# Patient Record
Sex: FEMALE | Race: BLACK OR AFRICAN AMERICAN | NOT HISPANIC OR LATINO | Employment: FULL TIME | RURAL
[De-identification: names, ages, dates, MRNs, and addresses within clinical notes are randomized per-mention and may not be internally consistent; named-entity substitution may affect disease eponyms.]

---

## 2017-01-04 DIAGNOSIS — M54.40 BILATERAL LOW BACK PAIN WITH SCIATICA: ICD-10-CM

## 2017-01-04 RX ORDER — IBUPROFEN 800 MG/1
TABLET ORAL
Qty: 120 TABLET | Refills: 0 | Status: SHIPPED | OUTPATIENT
Start: 2017-01-04 | End: 2017-01-27 | Stop reason: SDUPTHER

## 2017-01-18 ENCOUNTER — OFFICE VISIT (OUTPATIENT)
Dept: RETAIL CLINIC | Facility: CLINIC | Age: 42
End: 2017-01-18

## 2017-01-18 VITALS
SYSTOLIC BLOOD PRESSURE: 138 MMHG | DIASTOLIC BLOOD PRESSURE: 80 MMHG | HEART RATE: 110 BPM | BODY MASS INDEX: 39.08 KG/M2 | HEIGHT: 67 IN | OXYGEN SATURATION: 99 % | WEIGHT: 249 LBS | TEMPERATURE: 98.6 F

## 2017-01-18 DIAGNOSIS — L73.2 SUPPURATIVE HIDRADENITIS: Primary | ICD-10-CM

## 2017-01-18 PROCEDURE — 99212 OFFICE O/P EST SF 10 MIN: CPT | Performed by: NURSE PRACTITIONER

## 2017-01-18 RX ORDER — CEPHALEXIN 500 MG/1
500 CAPSULE ORAL 3 TIMES DAILY
Qty: 30 CAPSULE | Refills: 0 | Status: SHIPPED | OUTPATIENT
Start: 2017-01-18 | End: 2017-01-27

## 2017-01-18 NOTE — PROGRESS NOTES
Subjective   Owen Hooper is a 41 y.o. female.     HPI Comments: Patient reports history of suppurative hidradenitis about 3 years ago and seen by surgeon who recommended surgery.  Areas were drained and improved so she never followed up.  Has really had no subsequent problems until the last 3 weeks.     Abscess   This is a new problem. Episode onset: over the last three weeks--has had two to left axilla that have opened and drained on their own, one to right axilla is not draining and painful. The problem occurs constantly. The problem has been gradually worsening. Pertinent negatives include no abdominal pain, anorexia, arthralgias, change in bowel habit, chest pain, chills, congestion, coughing, diaphoresis, fatigue, fever, headaches, joint swelling, myalgias, nausea, neck pain, numbness, rash, sore throat, swollen glands, urinary symptoms, vertigo, visual change, vomiting or weakness. Exacerbated by: lifting right arm. Treatments tried: simmons, warm compresses, ibuprofen. The treatment provided no relief.        The following portions of the patient's history were reviewed and updated as appropriate: allergies, current medications, past medical history and past social history.    Review of Systems   Constitutional: Negative for activity change, appetite change, chills, diaphoresis, fatigue and fever.   HENT: Negative for congestion and sore throat.    Respiratory: Negative for cough, chest tightness and wheezing.    Cardiovascular: Negative.  Negative for chest pain.   Gastrointestinal: Negative for abdominal pain, anorexia, change in bowel habit, diarrhea, nausea and vomiting.   Musculoskeletal: Negative for arthralgias, joint swelling, myalgias, neck pain and neck stiffness.   Skin: Negative for rash.        Abscess axilla     Neurological: Negative for dizziness, vertigo, weakness, numbness and headaches.   Hematological: Negative for adenopathy.   Psychiatric/Behavioral: Negative.        Objective   "  Visit Vitals   • /80 (BP Location: Left arm, Patient Position: Sitting, Cuff Size: Adult)   • Pulse 110   • Temp 98.6 °F (37 °C) (Tympanic)   • Ht 67\" (170.2 cm)   • Wt 249 lb (113 kg)   • SpO2 99%   • BMI 39 kg/m2       Physical Exam   Constitutional: She is oriented to person, place, and time. She appears well-developed. No distress.   Neurological: She is alert and oriented to person, place, and time.   Skin:   Two healing abscessed areas to left axilla.     Nickel sized indurated, very tender area to right axilla with no opening or drainage noted.     Psychiatric: She has a normal mood and affect. Her behavior is normal.   Nursing note and vitals reviewed.      Assessment/Plan   Owen was seen today for abscess.    Diagnoses and all orders for this visit:    Suppurative hidradenitis  -     cephalexin (KEFLEX) 500 MG capsule; Take 1 capsule by mouth 3 (Three) Times a Day.  -     Benzocaine (BOIL EASE MAXIMUM STRENGTH) 20 % ointment; Apply to affected area to right axilla QID prn pain      Warm compresses over the area  If you must wear deodorant, use spray deodorant    F/U with PCP as scheduled for 1-27-17 for recheck and possible referral back to surgeon if needed.     Declines RTW note.         "

## 2017-01-18 NOTE — PATIENT INSTRUCTIONS
Hidradenitis Suppurativa  Hidradenitis suppurativa is a long-term (chronic) skin disease that starts with blocked sweat glands or hair follicles. Bacteria may grow in these blocked openings of your skin. Hidradenitis suppurativa is like a severe form of acne that develops in areas of your body where acne would be unusual. It is most likely to affect the areas of your body where skin rubs against skin and becomes moist. This includes your:  · Underarms.  · Groin.  · Genital areas.  · Buttocks.  · Upper thighs.  · Breasts.  Hidradenitis suppurativa may start out with small pimples. The pimples can develop into deep sores that break open (rupture) and drain pus. Over time your skin may thicken and become scarred. Hidradenitis suppurativa cannot be passed from person to person.   CAUSES   The exact cause of hidradenitis suppurativa is not known. This condition may be due to:  · Male and female hormones. The condition is rare before and after puberty.  · An overactive body defense system (immune system). Your immune system may overreact to the blocked hair follicles or sweat glands and cause swelling and pus-filled sores.  RISK FACTORS  You may have a higher risk of hidradenitis suppurativa if you:  · Are a woman.  · Are between ages 11 and 55.  · Have a family history of hidradenitis suppurativa.  · Have a personal history of acne.  · Are overweight.  · Smoke.  · Take the drug lithium.  SIGNS AND SYMPTOMS   The first signs of an outbreak are usually painful skin bumps that look like pimples. As the condition progresses:  · Skin bumps may get bigger and grow deeper into the skin.  · Bumps under the skin may rupture and drain smelly pus.  · Skin may become itchy and infected.  · Skin may thicken and scar.  · Drainage may continue through tunnels under the skin (fistulas).  · Walking and moving your arms can become painful.  DIAGNOSIS   Your health care provider may diagnose hidradenitis suppurativa based on your medical  history and your signs and symptoms. A physical exam will also be done. You may need to see a health care provider who specializes in skin diseases (dermatologist). You may also have tests done to confirm the diagnosis. These can include:  · Swabbing a sample of pus or drainage from your skin so it can be sent to the lab and tested for infection.  · Blood tests to check for infection.  TREATMENT   The same treatment will not work for everybody with hidradenitis suppurativa. Your treatment will depend on how severe your symptoms are. You may need to try several treatments to find what works best for you. Part of your treatment may include cleaning and bandaging (dressing) your wounds. You may also have to take medicines, such as the following:  · Antibiotics.  · Acne medicines.  · Medicines to block or suppress the immune system.  · A diabetes medicine (metformin) is sometimes used to treat this condition.  · For women, birth control pills can sometimes help relieve symptoms.  You may need surgery if you have a severe case of hidradenitis suppurativa that does not respond to medicine. Surgery may involve:   · Using a laser to clear the skin and remove hair follicles.  · Opening and draining deep sores.  · Removing the areas of skin that are diseased and scarred.  HOME CARE INSTRUCTIONS  · Learn as much as you can about your disease, and work closely with your health care providers.  · Take medicines only as directed by your health care provider.  · If you were prescribed an antibiotic medicine, finish it all even if you start to feel better.  · If you are overweight, losing weight may be very helpful. Try to reach and maintain a healthy weight.  · Do not use any tobacco products, including cigarettes, chewing tobacco, or electronic cigarettes. If you need help quitting, ask your health care provider.  · Do not shave the areas where you get hidradenitis suppurativa.  · Do not wear deodorant.  · Wear loose-fitting  clothes.  · Try not to overheat and get sweaty.  · Take a daily bleach bath as directed by your health care provider.  ¨ Fill your bathtub FDC with water.  ¨ Pour in ½ cup of unscented household bleach.  ¨ Soak for 5-10 minutes.  · Cover sore areas with a warm, clean washcloth (compress) for 5-10 minutes.  SEEK MEDICAL CARE IF:   · You have a flare-up of hidradenitis suppurativa.  · You have chills or a fever.  · You are having trouble controlling your symptoms at home.     This information is not intended to replace advice given to you by your health care provider. Make sure you discuss any questions you have with your health care provider.     Document Released: 08/01/2005 Document Revised: 01/08/2016 Document Reviewed: 03/20/2015  [employer] Interactive Patient Education ©2016 [employer]

## 2017-01-18 NOTE — MR AVS SNAPSHOT
Owen Hooper   1/18/2017 4:00 PM   Office Visit    Dept Phone:  [phone]   Encounter #:  [id number]    Provider:  [facility]   [facility]                Your Full Care Plan              Today's Medication Changes          These changes are accurate as of: 1/18/17  4:34 PM.  If you have any questions, ask your nurse or doctor.               New Medication(s)Ordered:     Benzocaine 20 % ointment   Commonly known as:  BOIL EASE MAXIMUM STRENGTH   Apply to affected area to right axilla QID prn pain       cephalexin 500 MG capsule   Commonly known as:  KEFLEX   Take 1 capsule by mouth 3 (Three) Times a Day.            Where to Get Your Medications      These medications were sent to [facility] [address] [place] [address] DRIVE - [phone] [facility] [phone] [address] [address]     Phone:  [phone]     Benzocaine 20 % ointment    cephalexin 500 MG capsule                  Your Updated Medication List          This list is accurate as of: 1/18/17  4:34 PM.  Always use your most recent med list.                Benzocaine 20 % ointment   Commonly known as:  BOIL EASE MAXIMUM STRENGTH   Apply to affected area to right axilla QID prn pain       cephalexin 500 MG capsule   Commonly known as:  KEFLEX   Take 1 capsule by mouth 3 (Three) Times a Day.       gabapentin 600 MG tablet   Commonly known as:  NEURONTIN   Take 1 tablet by mouth 3 (three) times a day.       ibuprofen 800 MG tablet   Commonly known as:  ADVIL,MOTRIN   TAKE 1 TABLET BY MOUTH EVERY 6 HOURS AS NEEDED FOR MODERATE PAIN       lisinopril 20 MG tablet   Commonly known as:  PRINIVIL,ZESTRIL   Take 1 tablet by mouth daily.       sertraline 50 MG tablet   Commonly known as:  ZOLOFT   Take 1 tablet by mouth daily.               You Were Diagnosed With        Codes Comments    Suppurative hidradenitis    -  Primary ICD-10-CM: L73.2  ICD-9-CM: 705.83          Instructions    Hidradenitis Suppurativa  Hidradenitis suppurativa is a long-term (chronic) skin disease that starts with blocked sweat glands or hair follicles. Bacteria may grow in these blocked openings of your skin. Hidradenitis suppurativa is like a severe form of acne that develops in areas of your body where acne would be unusual. It is most likely to affect the areas of your body where skin rubs against skin and becomes moist. This includes your:  · Underarms.  · Groin.  · Genital areas.  · Buttocks.  · Upper thighs.  · Breasts.  Hidradenitis suppurativa may start out with small pimples. The pimples can develop into deep sores that break open (rupture) and drain pus. Over time your skin may thicken and become scarred. Hidradenitis suppurativa cannot be passed from person to person.   CAUSES   The exact cause of hidradenitis suppurativa is not known. This condition may be due to:  · Male and female hormones. The condition is rare before and after puberty.  · An overactive body defense system (immune system). Your immune system may overreact to the blocked hair follicles or sweat glands and cause swelling and pus-filled sores.  RISK FACTORS  You may have a higher risk of hidradenitis suppurativa if you:  · Are a woman.  · Are between ages 11 and 55.  · Have a family history of hidradenitis suppurativa.  · Have a personal history of acne.  · Are overweight.  · Smoke.  · Take the drug lithium.  SIGNS AND SYMPTOMS   The first signs of an outbreak are usually painful skin bumps that look like pimples. As the condition progresses:  · Skin bumps may get bigger and grow deeper into the skin.  · Bumps under the skin may rupture and drain smelly pus.  · Skin may become itchy and infected.  · Skin may thicken and scar.  · Drainage may continue through tunnels under the skin (fistulas).  · Walking and moving your arms can become painful.  DIAGNOSIS   Your health care provider may diagnose hidradenitis suppurativa  based on your medical history and your signs and symptoms. A physical exam will also be done. You may need to see a health care provider who specializes in skin diseases (dermatologist). You may also have tests done to confirm the diagnosis. These can include:  · Swabbing a sample of pus or drainage from your skin so it can be sent to the lab and tested for infection.  · Blood tests to check for infection.  TREATMENT   The same treatment will not work for everybody with hidradenitis suppurativa. Your treatment will depend on how severe your symptoms are. You may need to try several treatments to find what works best for you. Part of your treatment may include cleaning and bandaging (dressing) your wounds. You may also have to take medicines, such as the following:  · Antibiotics.  · Acne medicines.  · Medicines to block or suppress the immune system.  · A diabetes medicine (metformin) is sometimes used to treat this condition.  · For women, birth control pills can sometimes help relieve symptoms.  You may need surgery if you have a severe case of hidradenitis suppurativa that does not respond to medicine. Surgery may involve:   · Using a laser to clear the skin and remove hair follicles.  · Opening and draining deep sores.  · Removing the areas of skin that are diseased and scarred.  HOME CARE INSTRUCTIONS  · Learn as much as you can about your disease, and work closely with your health care providers.  · Take medicines only as directed by your health care provider.  · If you were prescribed an antibiotic medicine, finish it all even if you start to feel better.  · If you are overweight, losing weight may be very helpful. Try to reach and maintain a healthy weight.  · Do not use any tobacco products, including cigarettes, chewing tobacco, or electronic cigarettes. If you need help quitting, ask your health care provider.  · Do not shave the areas where you get hidradenitis suppurativa.  · Do not wear  deodorant.  · Wear loose-fitting clothes.  · Try not to overheat and get sweaty.  · Take a daily bleach bath as directed by your health care provider.  ¨ Fill your bathtub FPC with water.  ¨ Pour in ½ cup of unscented household bleach.  ¨ Soak for 5-10 minutes.  · Cover sore areas with a warm, clean washcloth (compress) for 5-10 minutes.  SEEK MEDICAL CARE IF:   · You have a flare-up of hidradenitis suppurativa.  · You have chills or a fever.  · You are having trouble controlling your symptoms at home.     This information is not intended to replace advice given to you by your health care provider. Make sure you discuss any questions you have with your health care provider.     Document Released: 08/01/2005 Document Revised: 01/08/2016 Document Reviewed: 03/20/2015  [employer] Interactive Patient Education ©2016 [employer]       Patient Instructions History      Upcoming Appointments     Visit Type Date Time Department    OFFICE VISIT 1/18/2017  4:00 PM [facility]    OFFICE VISIT 1/27/2017 11:15 AM [facility]    OFFICE VISIT 3/6/2017  9:00 AM [facility]      MyChart Signup     Our records indicate that your [facility] account has been deactivated. If you would like to reactivate your account, please email [email or web address] or call [phone] to talk to our [employer] staff.             Other Info from Your Visit           Your Appointments     Jan 27, 2017 11:15 AM CST   Office Visit with Fabi Land MD   [facility] (--)    [address]  [address] [place] [id number] [phone]           Arrive 15 minutes prior to appointment.            Mar 06, 2017  9:00 AM CST   Office Visit with Fabi Land MD   [facility] (--)    [address]  [address] [place] [id number] [phone]           Arrive 15 minutes prior to appointment.  "             Allergies     No Known Allergies      Reason for Visit     Abscess pt states she has had this for 2 weeks, boil popped up under left arm, then busted, then boil popped up under right arm, haveing trouble lifting arm due to pain      Vital Signs     Blood Pressure Pulse Temperature Height Weight Oxygen Saturation    138/80 (BP Location: Left arm, Patient Position: Sitting, Cuff Size: Adult) 110 98.6 °F (37 °C) (Tympanic) 67\" (170.2 cm) 249 lb (113 kg) 99%    Body Mass Index Smoking Status                39 kg/m2 Current Every Day Smoker          Problems and Diagnoses Noted     Suppurative hidradenitis    -  Primary        "

## 2017-01-27 ENCOUNTER — OFFICE VISIT (OUTPATIENT)
Dept: FAMILY MEDICINE CLINIC | Facility: CLINIC | Age: 42
End: 2017-01-27

## 2017-01-27 VITALS
DIASTOLIC BLOOD PRESSURE: 92 MMHG | WEIGHT: 250 LBS | BODY MASS INDEX: 39.24 KG/M2 | HEIGHT: 67 IN | SYSTOLIC BLOOD PRESSURE: 122 MMHG

## 2017-01-27 DIAGNOSIS — I10 ESSENTIAL HYPERTENSION: ICD-10-CM

## 2017-01-27 DIAGNOSIS — M54.41 ACUTE BILATERAL LOW BACK PAIN WITH RIGHT-SIDED SCIATICA: Primary | ICD-10-CM

## 2017-01-27 DIAGNOSIS — L73.2 AXILLARY HIDRADENITIS SUPPURATIVA: ICD-10-CM

## 2017-01-27 PROCEDURE — 99213 OFFICE O/P EST LOW 20 MIN: CPT | Performed by: FAMILY MEDICINE

## 2017-01-27 RX ORDER — CLINDAMYCIN PHOSPHATE 11.9 MG/ML
SOLUTION TOPICAL 2 TIMES DAILY
Qty: 60 ML | Refills: 2 | Status: SHIPPED | OUTPATIENT
Start: 2017-01-27 | End: 2017-11-07

## 2017-01-27 RX ORDER — GABAPENTIN 600 MG/1
600 TABLET ORAL 3 TIMES DAILY
Qty: 270 TABLET | Refills: 2 | Status: SHIPPED | OUTPATIENT
Start: 2017-01-27 | End: 2017-01-27

## 2017-01-27 RX ORDER — GABAPENTIN 800 MG/1
800 TABLET ORAL 3 TIMES DAILY
Qty: 90 TABLET | Refills: 3 | Status: SHIPPED | OUTPATIENT
Start: 2017-01-27 | End: 2017-05-23 | Stop reason: SDUPTHER

## 2017-01-27 RX ORDER — IBUPROFEN 800 MG/1
800 TABLET ORAL EVERY 6 HOURS PRN
Qty: 120 TABLET | Refills: 0 | Status: SHIPPED | OUTPATIENT
Start: 2017-01-27 | End: 2017-11-07

## 2017-01-27 NOTE — PROGRESS NOTES
Subjective   Owen Hooper is a 41 y.o. female.     Hypertension   This is a chronic problem. The current episode started more than 1 year ago. The problem is unchanged. The problem is controlled. Pertinent negatives include no anxiety, blurred vision, chest pain, headaches, malaise/fatigue, neck pain, orthopnea, palpitations, peripheral edema, PND, shortness of breath or sweats. There are no associated agents to hypertension. Risk factors for coronary artery disease include obesity, sedentary lifestyle and stress. Past treatments include ACE inhibitors. The current treatment provides moderate improvement. There are no compliance problems.  There is no history of angina, kidney disease, CAD/MI, CVA, heart failure, left ventricular hypertrophy, PVD or retinopathy.      She has been having tingling in her foot on the right side.  She has pain that goes down the back on her leg on the right side and down the lateral part of her leg.  She says that it is tingling/ and shooting pain.  She says that this started about 2 months.  She has had back pain for some time now and that hasn't seemed to get worse. She has been stretching and taking ibuprofen for that.  She has boils under her arms.  They have opened up several times and they come and go.  She has had them on and off for about a month.  She had those in the past.  She hasn't had them in her groin.  She was seen in the urgent care and treated with abx.        Current Outpatient Prescriptions:   •  gabapentin (NEURONTIN) 600 MG tablet, Take 1 tablet by mouth 3 (three) times a day., Disp: 270 tablet, Rfl: 2  •  ibuprofen (ADVIL,MOTRIN) 800 MG tablet, TAKE 1 TABLET BY MOUTH EVERY 6 HOURS AS NEEDED FOR MODERATE PAIN, Disp: 120 tablet, Rfl: 0  •  lisinopril (PRINIVIL,ZESTRIL) 20 MG tablet, Take 1 tablet by mouth daily., Disp: 90 tablet, Rfl: 2  •  sertraline (ZOLOFT) 50 MG tablet, Take 1 tablet by mouth daily., Disp: 90 tablet, Rfl: 2    The following portions of  "the patient's history were reviewed and updated as appropriate: allergies, current medications, past family history, past medical history, past social history, past surgical history and problem list.    Review of Systems   Constitutional: Positive for fatigue. Negative for activity change, appetite change, malaise/fatigue and unexpected weight change.   Eyes: Negative for blurred vision.   Respiratory: Negative for shortness of breath.    Cardiovascular: Negative for chest pain, palpitations, orthopnea, leg swelling and PND.   Gastrointestinal: Negative for abdominal distention, abdominal pain, constipation, diarrhea, nausea and vomiting.   Musculoskeletal: Positive for arthralgias, back pain, gait problem and myalgias. Negative for joint swelling and neck pain.   Skin: Positive for wound. Negative for pallor and rash.   Neurological: Negative for headaches.   Psychiatric/Behavioral: Negative for dysphoric mood and sleep disturbance. The patient is not nervous/anxious.        Objective    Vitals:    01/27/17 1122   BP: 122/92   Weight: 250 lb (113 kg)   Height: 67\" (170.2 cm)     Physical Exam   Constitutional: She is oriented to person, place, and time. She appears well-developed and well-nourished. No distress.   Cardiovascular: Normal rate, regular rhythm and normal heart sounds.    No murmur heard.  No LE edema.   Pulmonary/Chest: Effort normal and breath sounds normal. No respiratory distress.   Abdominal: Soft. Bowel sounds are normal. She exhibits no distension. There is no tenderness.   Musculoskeletal:   + straight leg test on the right side   Neurological: She is alert and oriented to person, place, and time.   Skin:   Scarring in axilla    Psychiatric: She has a normal mood and affect. Her behavior is normal. Judgment and thought content normal.   Nursing note and vitals reviewed.      Assessment/Plan   Problems Addressed this Visit        Cardiovascular and Mediastinum    Essential hypertension       " Nervous and Auditory    Low back pain - Primary    Relevant Medications    ibuprofen (ADVIL,MOTRIN) 800 MG tablet      Other Visit Diagnoses     Axillary hidradenitis suppurativa        Relevant Medications    clindamycin (CLEOCIN-T) 1 % external solution        1.) Back Pain with sciatica-  Printed her exercises to help with sciatica.  Increased her gabapentin.  Continue NSAIDS as well.  She still doesn't feel that she could make it to formal PT with her work schedule.  2.) Hidradenitis-  Will try topical clindamycin.  3.) HTN-  Doing well.  Continue current medications.    RTC in 3 months or sooner PRN

## 2017-01-27 NOTE — PATIENT INSTRUCTIONS
Hidradenitis Suppurativa  Hidradenitis suppurativa is a long-term (chronic) skin disease that starts with blocked sweat glands or hair follicles. Bacteria may grow in these blocked openings of your skin. Hidradenitis suppurativa is like a severe form of acne that develops in areas of your body where acne would be unusual. It is most likely to affect the areas of your body where skin rubs against skin and becomes moist. This includes your:  · Underarms.  · Groin.  · Genital areas.  · Buttocks.  · Upper thighs.  · Breasts.  Hidradenitis suppurativa may start out with small pimples. The pimples can develop into deep sores that break open (rupture) and drain pus. Over time your skin may thicken and become scarred. Hidradenitis suppurativa cannot be passed from person to person.   CAUSES   The exact cause of hidradenitis suppurativa is not known. This condition may be due to:  · Male and female hormones. The condition is rare before and after puberty.  · An overactive body defense system (immune system). Your immune system may overreact to the blocked hair follicles or sweat glands and cause swelling and pus-filled sores.  RISK FACTORS  You may have a higher risk of hidradenitis suppurativa if you:  · Are a woman.  · Are between ages 11 and 55.  · Have a family history of hidradenitis suppurativa.  · Have a personal history of acne.  · Are overweight.  · Smoke.  · Take the drug lithium.  SIGNS AND SYMPTOMS   The first signs of an outbreak are usually painful skin bumps that look like pimples. As the condition progresses:  · Skin bumps may get bigger and grow deeper into the skin.  · Bumps under the skin may rupture and drain smelly pus.  · Skin may become itchy and infected.  · Skin may thicken and scar.  · Drainage may continue through tunnels under the skin (fistulas).  · Walking and moving your arms can become painful.  DIAGNOSIS   Your health care provider may diagnose hidradenitis suppurativa based on your medical  history and your signs and symptoms. A physical exam will also be done. You may need to see a health care provider who specializes in skin diseases (dermatologist). You may also have tests done to confirm the diagnosis. These can include:  · Swabbing a sample of pus or drainage from your skin so it can be sent to the lab and tested for infection.  · Blood tests to check for infection.  TREATMENT   The same treatment will not work for everybody with hidradenitis suppurativa. Your treatment will depend on how severe your symptoms are. You may need to try several treatments to find what works best for you. Part of your treatment may include cleaning and bandaging (dressing) your wounds. You may also have to take medicines, such as the following:  · Antibiotics.  · Acne medicines.  · Medicines to block or suppress the immune system.  · A diabetes medicine (metformin) is sometimes used to treat this condition.  · For women, birth control pills can sometimes help relieve symptoms.  You may need surgery if you have a severe case of hidradenitis suppurativa that does not respond to medicine. Surgery may involve:   · Using a laser to clear the skin and remove hair follicles.  · Opening and draining deep sores.  · Removing the areas of skin that are diseased and scarred.  HOME CARE INSTRUCTIONS  · Learn as much as you can about your disease, and work closely with your health care providers.  · Take medicines only as directed by your health care provider.  · If you were prescribed an antibiotic medicine, finish it all even if you start to feel better.  · If you are overweight, losing weight may be very helpful. Try to reach and maintain a healthy weight.  · Do not use any tobacco products, including cigarettes, chewing tobacco, or electronic cigarettes. If you need help quitting, ask your health care provider.  · Do not shave the areas where you get hidradenitis suppurativa.  · Do not wear deodorant.  · Wear loose-fitting  clothes.  · Try not to overheat and get sweaty.  · Take a daily bleach bath as directed by your health care provider.  ¨ Fill your bathtub residential with water.  ¨ Pour in ½ cup of unscented household bleach.  ¨ Soak for 5-10 minutes.  · Cover sore areas with a warm, clean washcloth (compress) for 5-10 minutes.  SEEK MEDICAL CARE IF:   · You have a flare-up of hidradenitis suppurativa.  · You have chills or a fever.  · You are having trouble controlling your symptoms at home.     This information is not intended to replace advice given to you by your health care provider. Make sure you discuss any questions you have with your health care provider.     Document Released: 08/01/2005 Document Revised: 01/08/2016 Document Reviewed: 03/20/2015  [employer] Patient Education ©2016 [employer]    Sciatica With Rehab  The sciatic nerve runs from the back down the leg and is responsible for sensation and control of the muscles in the back (posterior) side of the thigh, lower leg, and foot. Sciatica is a condition that is characterized by inflammation of this nerve.   SYMPTOMS   · Signs of nerve damage, including numbness and/or weakness along the posterior side of the lower extremity.  · Pain in the back of the thigh that may also travel down the leg.  · Pain that worsens when sitting for long periods of time.  · Occasionally, pain in the back or buttock.  CAUSES   Inflammation of the sciatic nerve is the cause of sciatica. The inflammation is due to something irritating the nerve. Common sources of irritation include:  · Sitting for long periods of time.  · Direct trauma to the nerve.  · Arthritis of the spine.  · Herniated or ruptured disk.  · Slipping of the vertebrae (spondylolisthesis).  · Pressure from soft tissues, such as muscles or ligament-like tissue (fascia).  RISK INCREASES WITH:  · Sports that place pressure or stress on the spine (football or weightlifting).  · Poor strength and  flexibility.  · Failure to warm up properly before activity.  · Family history of low back pain or disk disorders.  · Previous back injury or surgery.  · Poor body mechanics, especially when lifting, or poor posture.  PREVENTION   · Warm up and stretch properly before activity.  · Maintain physical fitness:    Strength, flexibility, and endurance.  · Cardiovascular fitness.  · Learn and use proper technique, especially with posture and lifting. When possible, have  correct improper technique.  · Avoid activities that place stress on the spine.  PROGNOSIS  If treated properly, then sciatica usually resolves within 6 weeks. However, occasionally surgery is necessary.   RELATED COMPLICATIONS   · Permanent nerve damage, including pain, numbness, tingle, or weakness.  · Chronic back pain.  · Risks of surgery: infection, bleeding, nerve damage, or damage to surrounding tissues.  TREATMENT  Treatment initially involves resting from any activities that aggravate your symptoms. The use of ice and medication may help reduce pain and inflammation. The use of strengthening and stretching exercises may help reduce pain with activity. These exercises may be performed at home or with referral to a therapist. A therapist may recommend further treatments, such as transcutaneous electronic nerve stimulation (TENS) or ultrasound. Your caregiver may recommend corticosteroid injections to help reduce inflammation of the sciatic nerve. If symptoms persist despite non-surgical (conservative) treatment, then surgery may be recommended.  MEDICATION  · If pain medication is necessary, then nonsteroidal anti-inflammatory medications, such as aspirin and ibuprofen, or other minor pain relievers, such as acetaminophen, are often recommended.  · Do not take pain medication for 7 days before surgery.  · Prescription pain relievers may be given if deemed necessary by your caregiver. Use only as directed and only as much as you  need.  · Ointments applied to the skin may be helpful.  · Corticosteroid injections may be given by your caregiver. These injections should be reserved for the most serious cases, because they may only be given a certain number of times.  HEAT AND COLD  · Cold treatment (icing) relieves pain and reduces inflammation. Cold treatment should be applied for 10 to 15 minutes every 2 to 3 hours for inflammation and pain and immediately after any activity that aggravates your symptoms. Use ice packs or massage the area with a piece of ice (ice massage).  · Heat treatment may be used prior to performing the stretching and strengthening activities prescribed by your caregiver, physical therapist, or . Use a heat pack or soak the injury in warm water.  SEEK MEDICAL CARE IF:  · Treatment seems to offer no benefit, or the condition worsens.  · Any medications produce adverse side effects.  EXERCISES   RANGE OF MOTION (ROM) AND STRETCHING EXERCISES - Sciatica  Most people with sciatic will find that their symptoms worsen with either excessive bending forward (flexion) or arching at the low back (extension). The exercises which will help resolve your symptoms will focus on the opposite motion. Your physician, physical therapist or  will help you determine which exercises will be most helpful to resolve your low back pain. Do not complete any exercises without first consulting with your clinician. Discontinue any exercises which worsen your symptoms until you speak to your clinician. If you have pain, numbness or tingling which travels down into your buttocks, leg or foot, the goal of the therapy is for these symptoms to move closer to your back and eventually resolve. Occasionally, these leg symptoms will get better, but your low back pain may worsen; this is typically an indication of progress in your rehabilitation. Be certain to be very alert to any changes in your symptoms and the activities in  which you participated in the 24 hours prior to the change. Sharing this information with your clinician will allow him/her to most efficiently treat your condition.  These exercises may help you when beginning to rehabilitate your injury. Your symptoms may resolve with or without further involvement from your physician, physical therapist or . While completing these exercises, remember:   · Restoring tissue flexibility helps normal motion to return to the joints. This allows healthier, less painful movement and activity.  · An effective stretch should be held for at least 30 seconds.  · A stretch should never be painful. You should only feel a gentle lengthening or release in the stretched tissue.  FLEXION RANGE OF MOTION AND STRETCHING EXERCISES:  STRETCH - Flexion, Single Knee to Chest   · Lie on a firm bed or floor with both legs extended in front of you.  · Keeping one leg in contact with the floor, bring your opposite knee to your chest. Hold your leg in place by either grabbing behind your thigh or at your knee.  · Pull until you feel a gentle stretch in your low back. Hold __________ seconds.  · Slowly release your grasp and repeat the exercise with the opposite side.  Repeat __________ times. Complete this exercise __________ times per day.   STRETCH - Flexion, Double Knee to Chest  · Lie on a firm bed or floor with both legs extended in front of you.  · Keeping one leg in contact with the floor, bring your opposite knee to your chest.  · Tense your stomach muscles to support your back and then lift your other knee to your chest. Hold your legs in place by either grabbing behind your thighs or at your knees.  · Pull both knees toward your chest until you feel a gentle stretch in your low back. Hold __________ seconds.  · Tense your stomach muscles and slowly return one leg at a time to the floor.  Repeat __________ times. Complete this exercise __________ times per day.   STRETCH - Low Trunk  Rotation   · Lie on a firm bed or floor. Keeping your legs in front of you, bend your knees so they are both pointed toward the ceiling and your feet are flat on the floor.  · Extend your arms out to the side. This will stabilize your upper body by keeping your shoulders in contact with the floor.  · Gently and slowly drop both knees together to one side until you feel a gentle stretch in your low back. Hold for __________ seconds.  · Tense your stomach muscles to support your low back as you bring your knees back to the starting position. Repeat the exercise to the other side.  Repeat __________ times. Complete this exercise __________ times per day   EXTENSION RANGE OF MOTION AND FLEXIBILITY EXERCISES:  STRETCH - Extension, Prone on Elbows  · Lie on your stomach on the floor, a bed will be too soft. Place your palms about shoulder width apart and at the height of your head.  · Place your elbows under your shoulders. If this is too painful, stack pillows under your chest.  · Allow your body to relax so that your hips drop lower and make contact more completely with the floor.  · Hold this position for __________ seconds.  · Slowly return to lying flat on the floor.  Repeat __________ times. Complete this exercise __________ times per day.   RANGE OF MOTION - Extension, Prone Press Ups  · Lie on your stomach on the floor, a bed will be too soft. Place your palms about shoulder width apart and at the height of your head.  · Keeping your back as relaxed as possible, slowly straighten your elbows while keeping your hips on the floor. You may adjust the placement of your hands to maximize your comfort. As you gain motion, your hands will come more underneath your shoulders.  · Hold this position __________ seconds.  · Slowly return to lying flat on the floor.  Repeat __________ times. Complete this exercise __________ times per day.   STRENGTHENING EXERCISES - Sciatica   These exercises may help you when beginning to  "rehabilitate your injury. These exercises should be done near your \"sweet spot.\" This is the neutral, low-back arch, somewhere between fully rounded and fully arched, that is your least painful position. When performed in this safe range of motion, these exercises can be used for people who have either a flexion or extension based injury. These exercises may resolve your symptoms with or without further involvement from your physician, physical therapist or . While completing these exercises, remember:   · Muscles can gain both the endurance and the strength needed for everyday activities through controlled exercises.  · Complete these exercises as instructed by your physician, physical therapist or . Progress with the resistance and repetition exercises only as your caregiver advises.  · You may experience muscle soreness or fatigue, but the pain or discomfort you are trying to eliminate should never worsen during these exercises. If this pain does worsen, stop and make certain you are following the directions exactly. If the pain is still present after adjustments, discontinue the exercise until you can discuss the trouble with your clinician.  STRENGTHENING - Deep Abdominals, Pelvic Tilt   · Lie on a firm bed or floor. Keeping your legs in front of you, bend your knees so they are both pointed toward the ceiling and your feet are flat on the floor.  · Tense your lower abdominal muscles to press your low back into the floor. This motion will rotate your pelvis so that your tail bone is scooping upwards rather than pointing at your feet or into the floor.  · With a gentle tension and even breathing, hold this position for __________ seconds.  Repeat __________ times. Complete this exercise __________ times per day.   STRENGTHENING - Abdominals, Crunches   · Lie on a firm bed or floor. Keeping your legs in front of you, bend your knees so they are both pointed toward the ceiling and " your feet are flat on the floor. Cross your arms over your chest.  · Slightly tip your chin down without bending your neck.  · Tense your abdominals and slowly lift your trunk high enough to just clear your shoulder blades. Lifting higher can put excessive stress on the low back and does not further strengthen your abdominal muscles.  · Control your return to the starting position.  Repeat __________ times. Complete this exercise __________ times per day.   STRENGTHENING - Quadruped, Opposite UE/LE Lift  · Assume a hands and knees position on a firm surface. Keep your hands under your shoulders and your knees under your hips. You may place padding under your knees for comfort.  · Find your neutral spine and gently tense your abdominal muscles so that you can maintain this position. Your shoulders and hips should form a rectangle that is parallel with the floor and is not twisted.  · Keeping your trunk steady, lift your right hand no higher than your shoulder and then your left leg no higher than your hip. Make sure you are not holding your breath. Hold this position __________ seconds.  · Continuing to keep your abdominal muscles tense and your back steady, slowly return to your starting position. Repeat with the opposite arm and leg.  Repeat __________ times. Complete this exercise __________ times per day.   STRENGTHENING - Abdominals and Quadriceps, Straight Leg Raise   · Lie on a firm bed or floor with both legs extended in front of you.  · Keeping one leg in contact with the floor, bend the other knee so that your foot can rest flat on the floor.  · Find your neutral spine, and tense your abdominal muscles to maintain your spinal position throughout the exercise.  · Slowly lift your straight leg off the floor about 6 inches for a count of 15, making sure to not hold your breath.  · Still keeping your neutral spine, slowly lower your leg all the way to the floor.  Repeat this exercise with each leg __________  times. Complete this exercise __________ times per day.  POSTURE AND BODY MECHANICS CONSIDERATIONS - Sciatica  Keeping correct posture when sitting, standing or completing your activities will reduce the stress put on different body tissues, allowing injured tissues a chance to heal and limiting painful experiences. The following are general guidelines for improved posture. Your physician or physical therapist will provide you with any instructions specific to your needs. While reading these guidelines, remember:  · The exercises prescribed by your provider will help you have the flexibility and strength to maintain correct postures.  · The correct posture provides the optimal environment for your joints to work. All of your joints have less wear and tear when properly supported by a spine with good posture. This means you will experience a healthier, less painful body.  · Correct posture must be practiced with all of your activities, especially prolonged sitting and standing. Correct posture is as important when doing repetitive low-stress activities (typing) as it is when doing a single heavy-load activity (lifting).  RESTING POSITIONS  Consider which positions are most painful for you when choosing a resting position. If you have pain with flexion-based activities (sitting, bending, stooping, squatting), choose a position that allows you to rest in a less flexed posture. You would want to avoid curling into a fetal position on your side. If your pain worsens with extension-based activities (prolonged standing, working overhead), avoid resting in an extended position such as sleeping on your stomach. Most people will find more comfort when they rest with their spine in a more neutral position, neither too rounded nor too arched. Lying on a non-sagging bed on your side with a pillow between your knees, or on your back with a pillow under your knees will often provide some relief. Keep in mind, being in any one  position for a prolonged period of time, no matter how correct your posture, can still lead to stiffness.  PROPER SITTING POSTURE  In order to minimize stress and discomfort on your spine, you must sit with correct posture Sitting with good posture should be effortless for a healthy body. Returning to good posture is a gradual process. Many people can work toward this most comfortably by using various supports until they have the flexibility and strength to maintain this posture on their own.  When sitting with proper posture, your ears will fall over your shoulders and your shoulders will fall over your hips. You should use the back of the chair to support your upper back. Your low back will be in a neutral position, just slightly arched. You may place a small pillow or folded towel at the base of your low back for support.   When working at a desk, create an environment that supports good, upright posture. Without extra support, muscles fatigue and lead to excessive strain on joints and other tissues. Keep these recommendations in mind:  CHAIR:   · A chair should be able to slide under your desk when your back makes contact with the back of the chair. This allows you to work closely.  · The chair's height should allow your eyes to be level with the upper part of your monitor and your hands to be slightly lower than your elbows.  BODY POSITION  · Your feet should make contact with the floor. If this is not possible, use a foot rest.  · Keep your ears over your shoulders. This will reduce stress on your neck and low back.  INCORRECT SITTING POSTURES   · If you are feeling tired and unable to assume a healthy sitting posture, do not slouch or slump. This puts excessive strain on your back tissues, causing more damage and pain. Healthier options include:  · Using more support, like a lumbar pillow.  · Switching tasks to something that requires you to be upright or walking.  · Talking a brief walk.  · Lying down to  rest in a neutral-spine position.  PROLONGED STANDING WHILE SLIGHTLY LEANING FORWARD   When completing a task that requires you to lean forward while standing in one place for a long time, place either foot up on a stationary 2-4 inch high object to help maintain the best posture. When both feet are on the ground, the low back tends to lose its slight inward curve. If this curve flattens (or becomes too large), then the back and your other joints will experience too much stress, fatigue more quickly and can cause pain.   CORRECT STANDING POSTURES  Proper standing posture should be assumed with all daily activities, even if they only take a few moments, like when brushing your teeth. As in sitting, your ears should fall over your shoulders and your shoulders should fall over your hips. You should keep a slight tension in your abdominal muscles to brace your spine. Your tailbone should point down to the ground, not behind your body, resulting in an over-extended swayback posture.   INCORRECT STANDING POSTURES   Common incorrect standing postures include a forward head, locked knees and/or an excessive swayback.  WALKING  Walk with an upright posture. Your ears, shoulders and hips should all line-up.  PROLONGED ACTIVITY IN A FLEXED POSITION  When completing a task that requires you to bend forward at your waist or lean over a low surface, try to find a way to stabilize 3 of 4 of your limbs. You can place a hand or elbow on your thigh or rest a knee on the surface you are reaching across. This will provide you more stability so that your muscles do not fatigue as quickly. By keeping your knees relaxed, or slightly bent, you will also reduce stress across your low back.  CORRECT LIFTING TECHNIQUES  DO :   · Assume a wide stance. This will provide you more stability and the opportunity to get as close as possible to the object which you are lifting.  · Tense your abdominals to brace your spine; then bend at the knees and  hips. Keeping your back locked in a neutral-spine position, lift using your leg muscles. Lift with your legs, keeping your back straight.  · Test the weight of unknown objects before attempting to lift them.  · Try to keep your elbows locked down at your sides in order get the best strength from your shoulders when carrying an object.  · Always ask for help when lifting heavy or awkward objects.  INCORRECT LIFTING TECHNIQUES  DO NOT:   · Lock your knees when lifting, even if it is a small object.  · Bend and twist. Pivot at your feet or move your feet when needing to change directions.  · Assume that you cannot safely  a paperclip without proper posture.     This information is not intended to replace advice given to you by your health care provider. Make sure you discuss any questions you have with your health care provider.     Document Released: 12/18/2006 Document Revised: 05/03/2016 Document Reviewed: 04/01/2010  [employer] Patient Education ©2016 [employer]

## 2017-01-27 NOTE — MR AVS SNAPSHOT
Owen Hooper   1/27/2017 11:15 AM   Office Visit    Dept Phone:  [phone]   Encounter #:  [id number]    Provider:  Fabi Land MD   Department:  [facility]                Your Full Care Plan              Today's Medication Changes          These changes are accurate as of: 1/27/17 11:51 AM.  If you have any questions, ask your nurse or doctor.               New Medication(s)Ordered:     clindamycin 1 % external solution   Commonly known as:  CLEOCIN-T   Apply  topically 2 (Two) Times a Day.   Started by:  Fabi Land MD         Medication(s)that have changed:     ibuprofen 800 MG tablet   Commonly known as:  ADVIL,MOTRIN   Take 1 tablet by mouth Every 6 (Six) Hours As Needed for mild pain (1-3).   What changed:  See the new instructions.   Changed by:  Fabi Land MD         Stop taking medication(s)listed here:     Benzocaine 20 % ointment   Commonly known as:  BOIL EASE MAXIMUM STRENGTH   Stopped by:  Fabi Land MD           cephalexin 500 MG capsule   Commonly known as:  KEFLEX   Stopped by:  Fabi Land MD                Where to Get Your Medications      These medications were sent to [facility] [address] [place] [address] [phone] [facility] [phone] [address] [address]     Phone:  [phone]     clindamycin 1 % external solution    gabapentin 600 MG tablet    ibuprofen 800 MG tablet                  Your Updated Medication List          This list is accurate as of: 1/27/17 11:51 AM.  Always use your most recent med list.                clindamycin 1 % external solution   Commonly known as:  CLEOCIN-T   Apply  topically 2 (Two) Times a Day.       gabapentin 600 MG tablet   Commonly known as:  NEURONTIN   Take 1 tablet by mouth 3 (Three) Times a Day.       ibuprofen 800 MG tablet   Commonly known as:   ADVIL,MOTRIN   Take 1 tablet by mouth Every 6 (Six) Hours As Needed for mild pain (1-3).       lisinopril 20 MG tablet   Commonly known as:  PRINIVIL,ZESTRIL   Take 1 tablet by mouth daily.       sertraline 50 MG tablet   Commonly known as:  ZOLOFT   Take 1 tablet by mouth daily.               You Were Diagnosed With        Codes Comments    Axillary hidradenitis suppurativa    -  Primary ICD-10-CM: L73.2  ICD-9-CM: 705.83     Acute bilateral low back pain with right-sided sciatica     ICD-10-CM: M54.41  ICD-9-CM: 724.2, 724.3, 338.19       Instructions    Hidradenitis Suppurativa  Hidradenitis suppurativa is a long-term (chronic) skin disease that starts with blocked sweat glands or hair follicles. Bacteria may grow in these blocked openings of your skin. Hidradenitis suppurativa is like a severe form of acne that develops in areas of your body where acne would be unusual. It is most likely to affect the areas of your body where skin rubs against skin and becomes moist. This includes your:  · Underarms.  · Groin.  · Genital areas.  · Buttocks.  · Upper thighs.  · Breasts.  Hidradenitis suppurativa may start out with small pimples. The pimples can develop into deep sores that break open (rupture) and drain pus. Over time your skin may thicken and become scarred. Hidradenitis suppurativa cannot be passed from person to person.   CAUSES   The exact cause of hidradenitis suppurativa is not known. This condition may be due to:  · Male and female hormones. The condition is rare before and after puberty.  · An overactive body defense system (immune system). Your immune system may overreact to the blocked hair follicles or sweat glands and cause swelling and pus-filled sores.  RISK FACTORS  You may have a higher risk of hidradenitis suppurativa if you:  · Are a woman.  · Are between ages 11 and 55.  · Have a family history of hidradenitis suppurativa.  · Have a personal history of acne.  · Are overweight.  · Smoke.  · Take  the drug lithium.  SIGNS AND SYMPTOMS   The first signs of an outbreak are usually painful skin bumps that look like pimples. As the condition progresses:  · Skin bumps may get bigger and grow deeper into the skin.  · Bumps under the skin may rupture and drain smelly pus.  · Skin may become itchy and infected.  · Skin may thicken and scar.  · Drainage may continue through tunnels under the skin (fistulas).  · Walking and moving your arms can become painful.  DIAGNOSIS   Your health care provider may diagnose hidradenitis suppurativa based on your medical history and your signs and symptoms. A physical exam will also be done. You may need to see a health care provider who specializes in skin diseases (dermatologist). You may also have tests done to confirm the diagnosis. These can include:  · Swabbing a sample of pus or drainage from your skin so it can be sent to the lab and tested for infection.  · Blood tests to check for infection.  TREATMENT   The same treatment will not work for everybody with hidradenitis suppurativa. Your treatment will depend on how severe your symptoms are. You may need to try several treatments to find what works best for you. Part of your treatment may include cleaning and bandaging (dressing) your wounds. You may also have to take medicines, such as the following:  · Antibiotics.  · Acne medicines.  · Medicines to block or suppress the immune system.  · A diabetes medicine (metformin) is sometimes used to treat this condition.  · For women, birth control pills can sometimes help relieve symptoms.  You may need surgery if you have a severe case of hidradenitis suppurativa that does not respond to medicine. Surgery may involve:   · Using a laser to clear the skin and remove hair follicles.  · Opening and draining deep sores.  · Removing the areas of skin that are diseased and scarred.  HOME CARE INSTRUCTIONS  · Learn as much as you can about your disease, and work closely with your health  care providers.  · Take medicines only as directed by your health care provider.  · If you were prescribed an antibiotic medicine, finish it all even if you start to feel better.  · If you are overweight, losing weight may be very helpful. Try to reach and maintain a healthy weight.  · Do not use any tobacco products, including cigarettes, chewing tobacco, or electronic cigarettes. If you need help quitting, ask your health care provider.  · Do not shave the areas where you get hidradenitis suppurativa.  · Do not wear deodorant.  · Wear loose-fitting clothes.  · Try not to overheat and get sweaty.  · Take a daily bleach bath as directed by your health care provider.  ¨ Fill your bathtub skilled nursing with water.  ¨ Pour in ½ cup of unscented household bleach.  ¨ Soak for 5-10 minutes.  · Cover sore areas with a warm, clean washcloth (compress) for 5-10 minutes.  SEEK MEDICAL CARE IF:   · You have a flare-up of hidradenitis suppurativa.  · You have chills or a fever.  · You are having trouble controlling your symptoms at home.     This information is not intended to replace advice given to you by your health care provider. Make sure you discuss any questions you have with your health care provider.     Document Released: 08/01/2005 Document Revised: 01/08/2016 Document Reviewed: 03/20/2015  [employer] Patient Education ©2016 [employer]    Sciatica With Rehab  The sciatic nerve runs from the back down the leg and is responsible for sensation and control of the muscles in the back (posterior) side of the thigh, lower leg, and foot. Sciatica is a condition that is characterized by inflammation of this nerve.   SYMPTOMS   · Signs of nerve damage, including numbness and/or weakness along the posterior side of the lower extremity.  · Pain in the back of the thigh that may also travel down the leg.  · Pain that worsens when sitting for long periods of time.  · Occasionally, pain in the back or buttock.  CAUSES    Inflammation of the sciatic nerve is the cause of sciatica. The inflammation is due to something irritating the nerve. Common sources of irritation include:  · Sitting for long periods of time.  · Direct trauma to the nerve.  · Arthritis of the spine.  · Herniated or ruptured disk.  · Slipping of the vertebrae (spondylolisthesis).  · Pressure from soft tissues, such as muscles or ligament-like tissue (fascia).  RISK INCREASES WITH:  · Sports that place pressure or stress on the spine (football or weightlifting).  · Poor strength and flexibility.  · Failure to warm up properly before activity.  · Family history of low back pain or disk disorders.  · Previous back injury or surgery.  · Poor body mechanics, especially when lifting, or poor posture.  PREVENTION   · Warm up and stretch properly before activity.  · Maintain physical fitness:    Strength, flexibility, and endurance.  · Cardiovascular fitness.  · Learn and use proper technique, especially with posture and lifting. When possible, have  correct improper technique.  · Avoid activities that place stress on the spine.  PROGNOSIS  If treated properly, then sciatica usually resolves within 6 weeks. However, occasionally surgery is necessary.   RELATED COMPLICATIONS   · Permanent nerve damage, including pain, numbness, tingle, or weakness.  · Chronic back pain.  · Risks of surgery: infection, bleeding, nerve damage, or damage to surrounding tissues.  TREATMENT  Treatment initially involves resting from any activities that aggravate your symptoms. The use of ice and medication may help reduce pain and inflammation. The use of strengthening and stretching exercises may help reduce pain with activity. These exercises may be performed at home or with referral to a therapist. A therapist may recommend further treatments, such as transcutaneous electronic nerve stimulation (TENS) or ultrasound. Your caregiver may recommend corticosteroid injections to help reduce  inflammation of the sciatic nerve. If symptoms persist despite non-surgical (conservative) treatment, then surgery may be recommended.  MEDICATION  · If pain medication is necessary, then nonsteroidal anti-inflammatory medications, such as aspirin and ibuprofen, or other minor pain relievers, such as acetaminophen, are often recommended.  · Do not take pain medication for 7 days before surgery.  · Prescription pain relievers may be given if deemed necessary by your caregiver. Use only as directed and only as much as you need.  · Ointments applied to the skin may be helpful.  · Corticosteroid injections may be given by your caregiver. These injections should be reserved for the most serious cases, because they may only be given a certain number of times.  HEAT AND COLD  · Cold treatment (icing) relieves pain and reduces inflammation. Cold treatment should be applied for 10 to 15 minutes every 2 to 3 hours for inflammation and pain and immediately after any activity that aggravates your symptoms. Use ice packs or massage the area with a piece of ice (ice massage).  · Heat treatment may be used prior to performing the stretching and strengthening activities prescribed by your caregiver, physical therapist, or . Use a heat pack or soak the injury in warm water.  SEEK MEDICAL CARE IF:  · Treatment seems to offer no benefit, or the condition worsens.  · Any medications produce adverse side effects.  EXERCISES   RANGE OF MOTION (ROM) AND STRETCHING EXERCISES - Sciatica  Most people with sciatic will find that their symptoms worsen with either excessive bending forward (flexion) or arching at the low back (extension). The exercises which will help resolve your symptoms will focus on the opposite motion. Your physician, physical therapist or  will help you determine which exercises will be most helpful to resolve your low back pain. Do not complete any exercises without first consulting with  your clinician. Discontinue any exercises which worsen your symptoms until you speak to your clinician. If you have pain, numbness or tingling which travels down into your buttocks, leg or foot, the goal of the therapy is for these symptoms to move closer to your back and eventually resolve. Occasionally, these leg symptoms will get better, but your low back pain may worsen; this is typically an indication of progress in your rehabilitation. Be certain to be very alert to any changes in your symptoms and the activities in which you participated in the 24 hours prior to the change. Sharing this information with your clinician will allow him/her to most efficiently treat your condition.  These exercises may help you when beginning to rehabilitate your injury. Your symptoms may resolve with or without further involvement from your physician, physical therapist or . While completing these exercises, remember:   · Restoring tissue flexibility helps normal motion to return to the joints. This allows healthier, less painful movement and activity.  · An effective stretch should be held for at least 30 seconds.  · A stretch should never be painful. You should only feel a gentle lengthening or release in the stretched tissue.  FLEXION RANGE OF MOTION AND STRETCHING EXERCISES:  STRETCH - Flexion, Single Knee to Chest   · Lie on a firm bed or floor with both legs extended in front of you.  · Keeping one leg in contact with the floor, bring your opposite knee to your chest. Hold your leg in place by either grabbing behind your thigh or at your knee.  · Pull until you feel a gentle stretch in your low back. Hold __________ seconds.  · Slowly release your grasp and repeat the exercise with the opposite side.  Repeat __________ times. Complete this exercise __________ times per day.   STRETCH - Flexion, Double Knee to Chest  · Lie on a firm bed or floor with both legs extended in front of you.  · Keeping one leg in  contact with the floor, bring your opposite knee to your chest.  · Tense your stomach muscles to support your back and then lift your other knee to your chest. Hold your legs in place by either grabbing behind your thighs or at your knees.  · Pull both knees toward your chest until you feel a gentle stretch in your low back. Hold __________ seconds.  · Tense your stomach muscles and slowly return one leg at a time to the floor.  Repeat __________ times. Complete this exercise __________ times per day.   STRETCH - Low Trunk Rotation   · Lie on a firm bed or floor. Keeping your legs in front of you, bend your knees so they are both pointed toward the ceiling and your feet are flat on the floor.  · Extend your arms out to the side. This will stabilize your upper body by keeping your shoulders in contact with the floor.  · Gently and slowly drop both knees together to one side until you feel a gentle stretch in your low back. Hold for __________ seconds.  · Tense your stomach muscles to support your low back as you bring your knees back to the starting position. Repeat the exercise to the other side.  Repeat __________ times. Complete this exercise __________ times per day   EXTENSION RANGE OF MOTION AND FLEXIBILITY EXERCISES:  STRETCH - Extension, Prone on Elbows  · Lie on your stomach on the floor, a bed will be too soft. Place your palms about shoulder width apart and at the height of your head.  · Place your elbows under your shoulders. If this is too painful, stack pillows under your chest.  · Allow your body to relax so that your hips drop lower and make contact more completely with the floor.  · Hold this position for __________ seconds.  · Slowly return to lying flat on the floor.  Repeat __________ times. Complete this exercise __________ times per day.   RANGE OF MOTION - Extension, Prone Press Ups  · Lie on your stomach on the floor, a bed will be too soft. Place your palms about shoulder width apart and at  "the height of your head.  · Keeping your back as relaxed as possible, slowly straighten your elbows while keeping your hips on the floor. You may adjust the placement of your hands to maximize your comfort. As you gain motion, your hands will come more underneath your shoulders.  · Hold this position __________ seconds.  · Slowly return to lying flat on the floor.  Repeat __________ times. Complete this exercise __________ times per day.   STRENGTHENING EXERCISES - Sciatica   These exercises may help you when beginning to rehabilitate your injury. These exercises should be done near your \"sweet spot.\" This is the neutral, low-back arch, somewhere between fully rounded and fully arched, that is your least painful position. When performed in this safe range of motion, these exercises can be used for people who have either a flexion or extension based injury. These exercises may resolve your symptoms with or without further involvement from your physician, physical therapist or . While completing these exercises, remember:   · Muscles can gain both the endurance and the strength needed for everyday activities through controlled exercises.  · Complete these exercises as instructed by your physician, physical therapist or . Progress with the resistance and repetition exercises only as your caregiver advises.  · You may experience muscle soreness or fatigue, but the pain or discomfort you are trying to eliminate should never worsen during these exercises. If this pain does worsen, stop and make certain you are following the directions exactly. If the pain is still present after adjustments, discontinue the exercise until you can discuss the trouble with your clinician.  STRENGTHENING - Deep Abdominals, Pelvic Tilt   · Lie on a firm bed or floor. Keeping your legs in front of you, bend your knees so they are both pointed toward the ceiling and your feet are flat on the floor.  · Tense your " lower abdominal muscles to press your low back into the floor. This motion will rotate your pelvis so that your tail bone is scooping upwards rather than pointing at your feet or into the floor.  · With a gentle tension and even breathing, hold this position for __________ seconds.  Repeat __________ times. Complete this exercise __________ times per day.   STRENGTHENING - Abdominals, Crunches   · Lie on a firm bed or floor. Keeping your legs in front of you, bend your knees so they are both pointed toward the ceiling and your feet are flat on the floor. Cross your arms over your chest.  · Slightly tip your chin down without bending your neck.  · Tense your abdominals and slowly lift your trunk high enough to just clear your shoulder blades. Lifting higher can put excessive stress on the low back and does not further strengthen your abdominal muscles.  · Control your return to the starting position.  Repeat __________ times. Complete this exercise __________ times per day.   STRENGTHENING - Quadruped, Opposite UE/LE Lift  · Assume a hands and knees position on a firm surface. Keep your hands under your shoulders and your knees under your hips. You may place padding under your knees for comfort.  · Find your neutral spine and gently tense your abdominal muscles so that you can maintain this position. Your shoulders and hips should form a rectangle that is parallel with the floor and is not twisted.  · Keeping your trunk steady, lift your right hand no higher than your shoulder and then your left leg no higher than your hip. Make sure you are not holding your breath. Hold this position __________ seconds.  · Continuing to keep your abdominal muscles tense and your back steady, slowly return to your starting position. Repeat with the opposite arm and leg.  Repeat __________ times. Complete this exercise __________ times per day.   STRENGTHENING - Abdominals and Quadriceps, Straight Leg Raise   · Lie on a firm bed or  floor with both legs extended in front of you.  · Keeping one leg in contact with the floor, bend the other knee so that your foot can rest flat on the floor.  · Find your neutral spine, and tense your abdominal muscles to maintain your spinal position throughout the exercise.  · Slowly lift your straight leg off the floor about 6 inches for a count of 15, making sure to not hold your breath.  · Still keeping your neutral spine, slowly lower your leg all the way to the floor.  Repeat this exercise with each leg __________ times. Complete this exercise __________ times per day.  POSTURE AND BODY MECHANICS CONSIDERATIONS - Sciatica  Keeping correct posture when sitting, standing or completing your activities will reduce the stress put on different body tissues, allowing injured tissues a chance to heal and limiting painful experiences. The following are general guidelines for improved posture. Your physician or physical therapist will provide you with any instructions specific to your needs. While reading these guidelines, remember:  · The exercises prescribed by your provider will help you have the flexibility and strength to maintain correct postures.  · The correct posture provides the optimal environment for your joints to work. All of your joints have less wear and tear when properly supported by a spine with good posture. This means you will experience a healthier, less painful body.  · Correct posture must be practiced with all of your activities, especially prolonged sitting and standing. Correct posture is as important when doing repetitive low-stress activities (typing) as it is when doing a single heavy-load activity (lifting).  RESTING POSITIONS  Consider which positions are most painful for you when choosing a resting position. If you have pain with flexion-based activities (sitting, bending, stooping, squatting), choose a position that allows you to rest in a less flexed posture. You would want to avoid  curling into a fetal position on your side. If your pain worsens with extension-based activities (prolonged standing, working overhead), avoid resting in an extended position such as sleeping on your stomach. Most people will find more comfort when they rest with their spine in a more neutral position, neither too rounded nor too arched. Lying on a non-sagging bed on your side with a pillow between your knees, or on your back with a pillow under your knees will often provide some relief. Keep in mind, being in any one position for a prolonged period of time, no matter how correct your posture, can still lead to stiffness.  PROPER SITTING POSTURE  In order to minimize stress and discomfort on your spine, you must sit with correct posture Sitting with good posture should be effortless for a healthy body. Returning to good posture is a gradual process. Many people can work toward this most comfortably by using various supports until they have the flexibility and strength to maintain this posture on their own.  When sitting with proper posture, your ears will fall over your shoulders and your shoulders will fall over your hips. You should use the back of the chair to support your upper back. Your low back will be in a neutral position, just slightly arched. You may place a small pillow or folded towel at the base of your low back for support.   When working at a desk, create an environment that supports good, upright posture. Without extra support, muscles fatigue and lead to excessive strain on joints and other tissues. Keep these recommendations in mind:  CHAIR:   · A chair should be able to slide under your desk when your back makes contact with the back of the chair. This allows you to work closely.  · The chair's height should allow your eyes to be level with the upper part of your monitor and your hands to be slightly lower than your elbows.  BODY POSITION  · Your feet should make contact with the floor. If this  is not possible, use a foot rest.  · Keep your ears over your shoulders. This will reduce stress on your neck and low back.  INCORRECT SITTING POSTURES   · If you are feeling tired and unable to assume a healthy sitting posture, do not slouch or slump. This puts excessive strain on your back tissues, causing more damage and pain. Healthier options include:  · Using more support, like a lumbar pillow.  · Switching tasks to something that requires you to be upright or walking.  · Talking a brief walk.  · Lying down to rest in a neutral-spine position.  PROLONGED STANDING WHILE SLIGHTLY LEANING FORWARD   When completing a task that requires you to lean forward while standing in one place for a long time, place either foot up on a stationary 2-4 inch high object to help maintain the best posture. When both feet are on the ground, the low back tends to lose its slight inward curve. If this curve flattens (or becomes too large), then the back and your other joints will experience too much stress, fatigue more quickly and can cause pain.   CORRECT STANDING POSTURES  Proper standing posture should be assumed with all daily activities, even if they only take a few moments, like when brushing your teeth. As in sitting, your ears should fall over your shoulders and your shoulders should fall over your hips. You should keep a slight tension in your abdominal muscles to brace your spine. Your tailbone should point down to the ground, not behind your body, resulting in an over-extended swayback posture.   INCORRECT STANDING POSTURES   Common incorrect standing postures include a forward head, locked knees and/or an excessive swayback.  WALKING  Walk with an upright posture. Your ears, shoulders and hips should all line-up.  PROLONGED ACTIVITY IN A FLEXED POSITION  When completing a task that requires you to bend forward at your waist or lean over a low surface, try to find a way to stabilize 3 of 4 of your limbs. You can place a  hand or elbow on your thigh or rest a knee on the surface you are reaching across. This will provide you more stability so that your muscles do not fatigue as quickly. By keeping your knees relaxed, or slightly bent, you will also reduce stress across your low back.  CORRECT LIFTING TECHNIQUES  DO :   · Assume a wide stance. This will provide you more stability and the opportunity to get as close as possible to the object which you are lifting.  · Tense your abdominals to brace your spine; then bend at the knees and hips. Keeping your back locked in a neutral-spine position, lift using your leg muscles. Lift with your legs, keeping your back straight.  · Test the weight of unknown objects before attempting to lift them.  · Try to keep your elbows locked down at your sides in order get the best strength from your shoulders when carrying an object.  · Always ask for help when lifting heavy or awkward objects.  INCORRECT LIFTING TECHNIQUES  DO NOT:   · Lock your knees when lifting, even if it is a small object.  · Bend and twist. Pivot at your feet or move your feet when needing to change directions.  · Assume that you cannot safely  a paperclip without proper posture.     This information is not intended to replace advice given to you by your health care provider. Make sure you discuss any questions you have with your health care provider.     Document Released: 12/18/2006 Document Revised: 05/03/2016 Document Reviewed: 04/01/2010  Elsevier Interactive Patient Education ©2016 [employer]       Patient Instructions History      Upcoming Appointments     Visit Type Date Time Department    OFFICE VISIT 1/27/2017 11:15 AM [facility]    OFFICE VISIT 4/27/2017  9:15 AM [facility]      MyChart Signup     Our records indicate that you have declined [employer] signup. If you would like to sign up for [employer], please email [email or web address] or call [phone] to obtain an  "activation code.             Other Info from Your Visit           Your Appointments     Apr 27, 2017  9:15 AM CDT   Office Visit with Fabi Land MD   [facility] (--)    [address]  [facility] [place] [address] [place] [id number] [phone]           Arrive 15 minutes prior to appointment.              Allergies     No Known Allergies      Reason for Visit     Follow-up recheck for essential hypertension    Back Pain bilateral low back pain     Leg Pain right leg and foot numbness and tingling      Vital Signs     Blood Pressure Height Weight Last Menstrual Period Body Mass Index Smoking Status    122/92 67\" (170.2 cm) 250 lb (113 kg) (LMP Unknown) 39.16 kg/m2 Current Every Day Smoker      Problems and Diagnoses Noted     Low back pain    Axillary hidradenitis suppurativa    -  Primary        "

## 2017-02-10 ENCOUNTER — HOSPITAL ENCOUNTER (EMERGENCY)
Facility: HOSPITAL | Age: 42
Discharge: LEFT BEFORE DISCHARGE | End: 2017-02-10
Attending: FAMILY MEDICINE | Admitting: FAMILY MEDICINE

## 2017-02-10 ENCOUNTER — APPOINTMENT (OUTPATIENT)
Dept: GENERAL RADIOLOGY | Facility: HOSPITAL | Age: 42
End: 2017-02-10

## 2017-02-10 VITALS
DIASTOLIC BLOOD PRESSURE: 81 MMHG | HEART RATE: 68 BPM | HEIGHT: 67 IN | BODY MASS INDEX: 37.67 KG/M2 | SYSTOLIC BLOOD PRESSURE: 128 MMHG | WEIGHT: 240 LBS | RESPIRATION RATE: 18 BRPM | TEMPERATURE: 97.4 F | OXYGEN SATURATION: 96 %

## 2017-02-10 DIAGNOSIS — S90.122A CONTUSION OF LESSER TOE OF LEFT FOOT WITHOUT DAMAGE TO NAIL, INITIAL ENCOUNTER: Primary | ICD-10-CM

## 2017-02-10 PROCEDURE — 99281 EMR DPT VST MAYX REQ PHY/QHP: CPT

## 2017-02-21 NOTE — ED PROVIDER NOTES
Subjective   Patient is a 41 y.o. female presenting with lower extremity pain.   Lower Extremity Issue   Location:  Toe  Time since incident:  1 day  Toe location:  L little toe  Pain details:     Quality:  Aching and dull    Radiates to:  Does not radiate    Severity:  Mild    Onset quality:  Gradual    Duration:  1 day    Timing:  Constant    Progression:  Worsening  Chronicity:  New  Dislocation: no    Foreign body present:  No foreign bodies  Tetanus status:  Up to date  Prior injury to area:  No  Relieved by:  Acetaminophen  Worsened by:  Nothing  Associated symptoms: no back pain, no decreased ROM, no fatigue, no fever, no muscle weakness, no neck pain and no numbness    Risk factors: no concern for non-accidental trauma and no frequent fractures        Review of Systems   Constitutional: Negative.  Negative for fatigue and fever.   HENT: Negative.    Respiratory: Negative.    Cardiovascular: Negative.    Gastrointestinal: Negative.    Musculoskeletal: Negative for back pain and neck pain.   Neurological: Negative.    Hematological: Negative.    Psychiatric/Behavioral: Negative.        Past Medical History   Diagnosis Date   • Carpal tunnel syndrome      right   • Hypertension    • Major depressive disorder    • Tobacco use disorder, severe, dependence        No Known Allergies    Past Surgical History   Procedure Laterality Date   • Carpal tunnel release  right   • Trigger finger release Right      right ring finger   • Hysterectomy     •  section     • Tonsillectomy         History reviewed. No pertinent family history.    Social History     Social History   • Marital status: Single     Spouse name: N/A   • Number of children: N/A   • Years of education: N/A     Social History Main Topics   • Smoking status: Current Every Day Smoker     Packs/day: 0.50     Types: Cigarettes   • Smokeless tobacco: Never Used   • Alcohol use Yes      Comment: occasional   • Drug use: No   • Sexual activity: Defer      Other Topics Concern   • None     Social History Narrative   • None           Objective   Physical Exam   Constitutional: She is oriented to person, place, and time. She appears well-developed and well-nourished.   HENT:   Head: Normocephalic and atraumatic.   Right Ear: External ear normal.   Left Ear: External ear normal.   Nose: Nose normal.   Mouth/Throat: Oropharynx is clear and moist.   Eyes: Conjunctivae and EOM are normal. Pupils are equal, round, and reactive to light.   Neck: Normal range of motion. Neck supple.   Cardiovascular: Normal rate, regular rhythm, normal heart sounds and intact distal pulses.    Pulmonary/Chest: Effort normal and breath sounds normal. No accessory muscle usage. No respiratory distress. She exhibits no tenderness and no deformity.   Abdominal: Soft. Bowel sounds are normal. There is no tenderness.   Musculoskeletal: Normal range of motion. She exhibits tenderness.   Tenderness in left fifth toe- mild erythema noticed          Neurological: She is alert and oriented to person, place, and time. She has normal reflexes.   Skin: Skin is warm.   Psychiatric: She has a normal mood and affect. Her behavior is normal. Judgment and thought content normal.   Nursing note and vitals reviewed.      Procedures         ED Course  ED Course    pt is explained she needs an x ray to rule out any fracture.  After some time she wants to leave before any study x ray . She is alert and oriented and right state of mind to make that decision. She is explained to return               MDM  Number of Diagnoses or Management Options  Contusion of lesser toe of left foot without damage to nail, initial encounter:       Final diagnoses:   Contusion of lesser toe of left foot without damage to nail, initial encounter            Keyla Pineda MD  02/20/17 3276

## 2017-05-23 ENCOUNTER — TELEPHONE (OUTPATIENT)
Dept: FAMILY MEDICINE CLINIC | Facility: CLINIC | Age: 42
End: 2017-05-23

## 2017-05-23 RX ORDER — GABAPENTIN 800 MG/1
800 TABLET ORAL 3 TIMES DAILY
Qty: 90 TABLET | Refills: 3 | Status: SHIPPED | OUTPATIENT
Start: 2017-05-23 | End: 2017-11-07 | Stop reason: DRUGHIGH

## 2017-07-20 DIAGNOSIS — M25.562 ACUTE PAIN OF LEFT KNEE: Primary | ICD-10-CM

## 2017-07-21 ENCOUNTER — OFFICE VISIT (OUTPATIENT)
Dept: ORTHOPEDIC SURGERY | Facility: CLINIC | Age: 42
End: 2017-07-21

## 2017-07-21 VITALS
WEIGHT: 241 LBS | DIASTOLIC BLOOD PRESSURE: 78 MMHG | BODY MASS INDEX: 37.83 KG/M2 | SYSTOLIC BLOOD PRESSURE: 138 MMHG | HEIGHT: 67 IN

## 2017-07-21 DIAGNOSIS — M23.304 MENISCUS, MEDIAL, DERANGEMENT, LEFT: ICD-10-CM

## 2017-07-21 DIAGNOSIS — M25.562 ACUTE PAIN OF LEFT KNEE: Primary | ICD-10-CM

## 2017-07-21 DIAGNOSIS — M25.462 SWOLLEN L KNEE: ICD-10-CM

## 2017-07-21 PROBLEM — M23.305 MENISCUS, MEDIAL, DERANGEMENT: Status: ACTIVE | Noted: 2017-07-21

## 2017-07-21 PROCEDURE — 99214 OFFICE O/P EST MOD 30 MIN: CPT | Performed by: ORTHOPAEDIC SURGERY

## 2017-07-21 RX ORDER — INDOMETHACIN 25 MG/1
CAPSULE ORAL
Refills: 0 | COMMUNITY
Start: 2017-07-15 | End: 2017-11-07

## 2017-07-21 NOTE — PROGRESS NOTES
"Subjective   Owen Hooper is a 41 y.o. female. 1975- Consult- left knee pain      History of Present Illness   Patient is here for consult- left knee pain. Patient states that the pain started roughly a few months ago but has progressively gotten worse. Patient states that over the course of the past few weeks her left knee has \"gave out\" several different occasions. Patient states that the left knee is most painful to the lateral and anterior aspects. Patient was treated at [facility] in [place]. Patient states that the physician there tried to remove fluid but was not successful. Patient states that they obtained xray's and prescribed Ketorolac for pain. Patient states that she uses ice and elevation for pain relief. Patient brought her xray disc for today's office visit.      The following portions of the patient's history were reviewed and updated as appropriate:   She  has a past medical history of Carpal tunnel syndrome; Hypertension; Major depressive disorder; and Tobacco use disorder, severe, dependence.  She  does not have any pertinent problems on file.  She  has a past surgical history that includes Carpal tunnel release (right); Trigger finger release (Right); Hysterectomy;  section; and Tonsillectomy.  Her family history is not on file.  She  reports that she has been smoking Cigarettes.  She has been smoking about 0.50 packs per day. She has never used smokeless tobacco. She reports that she drinks alcohol. She reports that she does not use illicit drugs.  Current Outpatient Prescriptions   Medication Sig Dispense Refill   • clindamycin (CLEOCIN-T) 1 % external solution Apply  topically 2 (Two) Times a Day. 60 mL 2   • gabapentin (NEURONTIN) 800 MG tablet Take 1 tablet by mouth 3 (Three) Times a Day. 90 tablet 3   • ibuprofen (ADVIL,MOTRIN) 800 MG tablet Take 1 tablet by mouth Every 6 (Six) Hours As Needed for mild pain (1-3). 120 tablet 0   • indomethacin (INDOCIN) 25 MG capsule " "TK 1-2 CS PO TID PRN  0   • lisinopril (PRINIVIL,ZESTRIL) 20 MG tablet Take 1 tablet by mouth daily. 90 tablet 2   • sertraline (ZOLOFT) 50 MG tablet Take 1 tablet by mouth Daily. 90 tablet 2     No current facility-administered medications for this visit.      Current Outpatient Prescriptions on File Prior to Visit   Medication Sig   • clindamycin (CLEOCIN-T) 1 % external solution Apply  topically 2 (Two) Times a Day.   • gabapentin (NEURONTIN) 800 MG tablet Take 1 tablet by mouth 3 (Three) Times a Day.   • ibuprofen (ADVIL,MOTRIN) 800 MG tablet Take 1 tablet by mouth Every 6 (Six) Hours As Needed for mild pain (1-3).   • lisinopril (PRINIVIL,ZESTRIL) 20 MG tablet Take 1 tablet by mouth daily.   • sertraline (ZOLOFT) 50 MG tablet Take 1 tablet by mouth Daily.     No current facility-administered medications on file prior to visit.      She has No Known Allergies..    Review of Systems  REVIEW OF SYSTEMS:  Negative, other than presenting complaint.  HEENT: No headaches, diplopia, blurred vision, tinnitus, vertigo, epistaxis, hoarseness or sore throat.  Pulmonary: No cough, sputum, hemoptysis, dyspnea, wheezing, or chest pain.  Cardiac: No chest pain, palpitations, orthopnea, paroxysmal nocturnal dyspnea, shortness of breath, or pedal edema.  Gastrointestinal: No diarrhea, melena, or constipation.  Genitourinary: No dysuria, hematuria, nocturia, frequency, bladder or bowel incontinence.  Hematology: No history of any anemia, fatigue, fever, or chills or night sweats.  Dermatology: No rashes, pruritus, or increased pigmentation changes of the skin.     Objective   Physical Exam  /78 (BP Location: Right arm, Patient Position: Sitting)  Ht 67\" (170.2 cm)  Wt 241 lb (109 kg)  BMI 37.75 kg/m2    Social History     Social History   • Marital status: Single     Spouse name: N/A   • Number of children: N/A   • Years of education: N/A     Occupational History   • Not on file.     Social History Main Topics   • " Smoking status: Current Every Day Smoker     Packs/day: 0.50     Types: Cigarettes   • Smokeless tobacco: Never Used   • Alcohol use Yes      Comment: occasional   • Drug use: No   • Sexual activity: Defer     Other Topics Concern   • Not on file     Social History Narrative       HEENT: Normocephalic.  PERRLA.  TM's clear bilaterally.  Oropharynx: Clear.  Neck: Supple, with no adenopathy.  Chest: Equal bilateral expansion.  Clear to auscultation and percussion.  Heart: Regular sinus rhythm, S1 and S2 normal.  No murmurs or extra heart sounds heard.  Abdomen: Soft, nontender, and no organomegaly.  Neurological: cranial nerves II-XII normal Vascular: pulses are present  Dermatological: no rashes  or blemishes, or any abnormality of the skin.      Xray of the left knee shows there is no fracture or periosteal reaction. No evidence for focal lytic or sclerotic lesion. Joint spaces are well maintained. No joint effusion. No loose body. Normal left knee.    Examination of left knee shows positive Odalis sign in the medial knee joint, crepitus preseent, tenderness to palpation of the patella. Neuro intact. Quads:4/5       Assessment/Plan  tear of medial meniscus  Left knee. Plan: MRI.    Problems Addressed this Visit        Musculoskeletal and Integument    Acute pain of left knee - Primary    Relevant Orders    MRI Knee Left Without Contrast    Swollen L knee    Meniscus, medial, derangement

## 2017-08-01 ENCOUNTER — HOSPITAL ENCOUNTER (OUTPATIENT)
Dept: MRI IMAGING | Facility: HOSPITAL | Age: 42
Discharge: HOME | End: 2017-08-01
Admitting: ORTHOPAEDIC SURGERY

## 2017-08-01 DIAGNOSIS — M25.562 ACUTE PAIN OF LEFT KNEE: ICD-10-CM

## 2017-08-01 PROCEDURE — 73721 MRI JNT OF LWR EXTRE W/O DYE: CPT

## 2017-08-08 ENCOUNTER — OFFICE VISIT (OUTPATIENT)
Dept: ORTHOPEDIC SURGERY | Facility: CLINIC | Age: 42
End: 2017-08-08

## 2017-08-08 VITALS — HEIGHT: 67 IN | BODY MASS INDEX: 38.3 KG/M2 | WEIGHT: 244 LBS

## 2017-08-08 DIAGNOSIS — M25.562 ACUTE PAIN OF LEFT KNEE: Primary | ICD-10-CM

## 2017-08-08 DIAGNOSIS — M17.12 PRIMARY OSTEOARTHRITIS OF LEFT KNEE: ICD-10-CM

## 2017-08-08 PROCEDURE — 99213 OFFICE O/P EST LOW 20 MIN: CPT | Performed by: ORTHOPAEDIC SURGERY

## 2017-08-08 NOTE — PROGRESS NOTES
Subjective   Owen Hooper is a 41 y.o. female. Left knee-MRI Results.    History of Present Illness Patient is here today for left knee MRI results. Patient states she is having moderate pain in the left knee.     The following portions of the patient's history were reviewed and updated as appropriate:   She  has a past medical history of Carpal tunnel syndrome; Hypertension; Major depressive disorder; and Tobacco use disorder, severe, dependence.  She  does not have any pertinent problems on file.  She  has a past surgical history that includes Carpal tunnel release (right); Trigger finger release (Right); Hysterectomy;  section; and Tonsillectomy.  Her family history is not on file.  She  reports that she has been smoking Cigarettes.  She has been smoking about 0.50 packs per day. She has never used smokeless tobacco. She reports that she drinks alcohol. She reports that she does not use illicit drugs.  Current Outpatient Prescriptions   Medication Sig Dispense Refill   • clindamycin (CLEOCIN-T) 1 % external solution Apply  topically 2 (Two) Times a Day. 60 mL 2   • gabapentin (NEURONTIN) 800 MG tablet Take 1 tablet by mouth 3 (Three) Times a Day. 90 tablet 3   • ibuprofen (ADVIL,MOTRIN) 800 MG tablet Take 1 tablet by mouth Every 6 (Six) Hours As Needed for mild pain (1-3). 120 tablet 0   • indomethacin (INDOCIN) 25 MG capsule TK 1-2 CS PO TID PRN  0   • lisinopril (PRINIVIL,ZESTRIL) 20 MG tablet Take 1 tablet by mouth daily. 90 tablet 2   • sertraline (ZOLOFT) 50 MG tablet Take 1 tablet by mouth Daily. 90 tablet 2     No current facility-administered medications for this visit.      Current Outpatient Prescriptions on File Prior to Visit   Medication Sig   • clindamycin (CLEOCIN-T) 1 % external solution Apply  topically 2 (Two) Times a Day.   • gabapentin (NEURONTIN) 800 MG tablet Take 1 tablet by mouth 3 (Three) Times a Day.   • ibuprofen (ADVIL,MOTRIN) 800 MG tablet Take 1 tablet by mouth Every 6  "(Six) Hours As Needed for mild pain (1-3).   • indomethacin (INDOCIN) 25 MG capsule TK 1-2 CS PO TID PRN   • lisinopril (PRINIVIL,ZESTRIL) 20 MG tablet Take 1 tablet by mouth daily.   • sertraline (ZOLOFT) 50 MG tablet Take 1 tablet by mouth Daily.     No current facility-administered medications on file prior to visit.      She has No Known Allergies..    Review of Systems  Ht 67\" (170.2 cm)  Wt 244 lb (111 kg)  LMP  (LMP Unknown)  BMI 38.22 kg/m2    Social History     Social History   • Marital status: Single     Spouse name: N/A   • Number of children: N/A   • Years of education: N/A     Occupational History   • Not on file.     Social History Main Topics   • Smoking status: Current Every Day Smoker     Packs/day: 0.50     Types: Cigarettes   • Smokeless tobacco: Never Used   • Alcohol use Yes      Comment: occasional   • Drug use: No   • Sexual activity: Defer     Other Topics Concern   • Not on file     Social History Narrative       HEENT: Normocephalic.  PERRLA.  TM's clear bilaterally.  Oropharynx: Clear.  Neck: Supple, with no adenopathy.  Chest: Equal bilateral expansion.  Clear to auscultation and percussion.  Heart: Regular sinus rhythm, S1 and S2 normal.  No murmurs or extra heart sounds heard.  Abdomen: Soft, nontender, and no organomegaly.  Neurological: cranial nerves II-XII normal Vascular: pulses are present  Dermatological: no rashes  or blemishes, or any abnormality of the skin.    REVIEW OF SYSTEMS:  Negative, other than presenting complaint.  HEENT: No headaches, diplopia, blurred vision, tinnitus, vertigo, epistaxis, hoarseness or sore throat.  Pulmonary: No cough, sputum, hemoptysis, dyspnea, wheezing, or chest pain.  Cardiac: No chest pain, palpitations, orthopnea, paroxysmal nocturnal dyspnea, shortness of breath, or pedal edema.  Gastrointestinal: No diarrhea, melena, or constipation.  Genitourinary: No dysuria, hematuria, nocturia, frequency, bladder or bowel incontinence.  Hematology: " No history of any anemia, fatigue, fever, or chills or night sweats.  Dermatology: No rashes, pruritus, or increased pigmentation changes of the skin.   Objective   Physical Exam  Pain on palpation of the medial joint line. Neuro intact.  Quads: 4/5 no instability, MRI shows bone edema  Proximal tibia lateral aspect  Reflecting stress changes,      Assessment/Plan  OA of left knee. Plan: PT Synvisc One injection  And meloxicam   will pre -cert for Synvisc,   Owen was seen today for results.    Diagnoses and all orders for this visit:    Acute pain of left knee

## 2017-11-07 ENCOUNTER — OFFICE VISIT (OUTPATIENT)
Dept: FAMILY MEDICINE CLINIC | Facility: CLINIC | Age: 42
End: 2017-11-07

## 2017-11-07 VITALS
BODY MASS INDEX: 36.88 KG/M2 | SYSTOLIC BLOOD PRESSURE: 122 MMHG | HEIGHT: 67 IN | DIASTOLIC BLOOD PRESSURE: 90 MMHG | WEIGHT: 235 LBS

## 2017-11-07 DIAGNOSIS — Z13.220 ENCOUNTER FOR SCREENING FOR LIPID DISORDER: ICD-10-CM

## 2017-11-07 DIAGNOSIS — M54.30 SCIATICA, UNSPECIFIED LATERALITY: ICD-10-CM

## 2017-11-07 DIAGNOSIS — Z79.899 HIGH RISK MEDICATION USE: ICD-10-CM

## 2017-11-07 DIAGNOSIS — I10 ESSENTIAL HYPERTENSION: Primary | ICD-10-CM

## 2017-11-07 DIAGNOSIS — F41.1 GENERALIZED ANXIETY DISORDER: ICD-10-CM

## 2017-11-07 PROBLEM — M25.562 ACUTE PAIN OF LEFT KNEE: Status: RESOLVED | Noted: 2017-07-21 | Resolved: 2017-11-07

## 2017-11-07 PROBLEM — M25.462 SWOLLEN L KNEE: Status: RESOLVED | Noted: 2017-07-21 | Resolved: 2017-11-07

## 2017-11-07 PROCEDURE — 99214 OFFICE O/P EST MOD 30 MIN: CPT | Performed by: FAMILY MEDICINE

## 2017-11-07 RX ORDER — GABAPENTIN 400 MG/1
400 CAPSULE ORAL 3 TIMES DAILY
Qty: 90 CAPSULE | Refills: 1 | Status: SHIPPED | OUTPATIENT
Start: 2017-11-07 | End: 2017-12-19 | Stop reason: SDUPTHER

## 2017-11-07 RX ORDER — SERTRALINE HYDROCHLORIDE 100 MG/1
100 TABLET, FILM COATED ORAL DAILY
Qty: 30 TABLET | Refills: 2 | Status: SHIPPED | OUTPATIENT
Start: 2017-11-07 | End: 2017-12-19 | Stop reason: SDUPTHER

## 2017-11-07 RX ORDER — HYDROXYZINE PAMOATE 25 MG/1
50 CAPSULE ORAL 3 TIMES DAILY PRN
Qty: 180 CAPSULE | Refills: 3 | Status: SHIPPED | OUTPATIENT
Start: 2017-11-07 | End: 2017-12-19 | Stop reason: SDUPTHER

## 2017-11-07 NOTE — PROGRESS NOTES
Subjective   Owen Hooper is a 41 y.o. female.     Hypertension   This is a chronic problem. The current episode started more than 1 year ago. The problem is unchanged. The problem is controlled. Associated symptoms include anxiety, headaches, malaise/fatigue and palpitations. Pertinent negatives include no blurred vision, chest pain, neck pain, orthopnea, peripheral edema, PND, shortness of breath or sweats. There are no associated agents to hypertension. Risk factors for coronary artery disease include obesity, sedentary lifestyle and stress. Past treatments include lifestyle changes. The current treatment provides moderate improvement. There is no history of angina, kidney disease, CAD/MI, CVA, heart failure, left ventricular hypertrophy, PVD or retinopathy.   Anxiety   Presents for initial visit. Onset was 1 to 6 months ago. The problem has been gradually worsening. Symptoms include depressed mood, excessive worry, insomnia, irritability, malaise, muscle tension, nervous/anxious behavior, palpitations and restlessness. Patient reports no chest pain, compulsions, confusion, decreased concentration, dizziness, dry mouth, feeling of choking, hyperventilation, impotence, nausea, obsessions, panic, shortness of breath or suicidal ideas. Symptoms occur most days. The severity of symptoms is moderate and causing significant distress. The symptoms are aggravated by family issues. The quality of sleep is poor. Nighttime awakenings: several.     Her past medical history is significant for anxiety/panic attacks. There is no history of anemia, arrhythmia, asthma, bipolar disorder, CAD, CHF, chronic lung disease, depression, fibromyalgia, hyperthyroidism or suicide attempts. Past treatments include SSRIs. The treatment provided mild relief. Compliance with prior treatments has been good.   She had been taking gabapentin for her back and sciatica. She has been out because she hasn't been able to get back in.  She says  "that was helping her back a lot.      Current Outpatient Prescriptions:   •  gabapentin (NEURONTIN) 800 MG tablet, Take 1 tablet by mouth 3 (Three) Times a Day., Disp: 90 tablet, Rfl: 3  •  sertraline (ZOLOFT) 50 MG tablet, Take 1 tablet by mouth Daily., Disp: 90 tablet, Rfl: 2    The following portions of the patient's history were reviewed and updated as appropriate: allergies, current medications, past family history, past medical history, past social history, past surgical history and problem list.    Review of Systems   Constitutional: Positive for activity change, appetite change, fatigue, irritability, malaise/fatigue and unexpected weight change. Negative for chills and fever.   Eyes: Negative for blurred vision and visual disturbance.   Respiratory: Negative for shortness of breath.    Cardiovascular: Positive for palpitations. Negative for chest pain, orthopnea, leg swelling and PND.   Gastrointestinal: Negative for abdominal distention, abdominal pain, constipation, diarrhea, nausea and vomiting.   Genitourinary: Negative for impotence.   Musculoskeletal: Positive for arthralgias and back pain. Negative for gait problem, joint swelling and neck pain.   Skin: Negative for pallor, rash and wound.   Neurological: Positive for numbness and headaches. Negative for dizziness.   Psychiatric/Behavioral: Positive for dysphoric mood and sleep disturbance. Negative for confusion, decreased concentration, self-injury and suicidal ideas. The patient is nervous/anxious and has insomnia.        Objective    Vitals:    11/07/17 1508   BP: 122/90   Weight: 235 lb (107 kg)   Height: 67\" (170.2 cm)       Physical Exam   Constitutional: She is oriented to person, place, and time. She appears well-developed and well-nourished. No distress.   Cardiovascular: Normal rate, regular rhythm and normal heart sounds.    No murmur heard.  No LE edema.   Pulmonary/Chest: Effort normal and breath sounds normal. No respiratory distress. "   Abdominal: Soft. Bowel sounds are normal. She exhibits no distension. There is no tenderness.   Musculoskeletal:   + straight leg test on the right side   Neurological: She is alert and oriented to person, place, and time.   Psychiatric: Her behavior is normal. Judgment and thought content normal.   Flat affect and appears fatigued   Nursing note and vitals reviewed.      Assessment/Plan   Problems Addressed this Visit        Cardiovascular and Mediastinum    Essential hypertension - Primary    Relevant Orders    Comprehensive Metabolic Panel       Other    Generalized anxiety disorder    Relevant Medications    hydrOXYzine (VISTARIL) 25 MG capsule    sertraline (ZOLOFT) 100 MG tablet    Other Relevant Orders    ToxASSURE Select 13 (MW) - Urine, Clean Catch    TSH      Other Visit Diagnoses     Sciatica, unspecified laterality        Encounter for screening for lipid disorder        Relevant Orders    Lipid Panel    High risk medication use        Relevant Orders    CBC (No Diff)    Hemoglobin A1c    ToxASSURE Select 13 (MW) - Urine, Clean Catch        1.) HTN-  Seems to be doing well without lisinopril.  Will monitor closely. She has lost some weight and that has helped.  Will check CMP, A1C, CBC today.  2.) LO-  Will add visteril PRN insomnia and anxiety.  Will also increase her sertraline to 100MG.  Checking TSH today.  3.) Sciatica-  Will continue gabapentin. UDS today. The patient has read and signed the [facility] Controlled Substance Contract.  I will continue to see patient for regular follow up appointments.  They are well controlled on their medication.  ALIYAH has been reviewed by me and is updated every 3 months. The patient is aware of the potential for addiction and dependence.  4.) Lipid screening today.  RTC in 1.5 months or sooner PRN

## 2017-12-19 ENCOUNTER — OFFICE VISIT (OUTPATIENT)
Dept: FAMILY MEDICINE CLINIC | Facility: CLINIC | Age: 42
End: 2017-12-19

## 2017-12-19 VITALS
BODY MASS INDEX: 38.14 KG/M2 | HEIGHT: 67 IN | SYSTOLIC BLOOD PRESSURE: 122 MMHG | TEMPERATURE: 98.1 F | OXYGEN SATURATION: 99 % | DIASTOLIC BLOOD PRESSURE: 88 MMHG | HEART RATE: 105 BPM | WEIGHT: 243 LBS

## 2017-12-19 DIAGNOSIS — F41.1 GENERALIZED ANXIETY DISORDER: Primary | ICD-10-CM

## 2017-12-19 DIAGNOSIS — M54.30 SCIATICA, UNSPECIFIED LATERALITY: ICD-10-CM

## 2017-12-19 DIAGNOSIS — J01.00 ACUTE NON-RECURRENT MAXILLARY SINUSITIS: ICD-10-CM

## 2017-12-19 PROCEDURE — 99214 OFFICE O/P EST MOD 30 MIN: CPT | Performed by: FAMILY MEDICINE

## 2017-12-19 RX ORDER — GABAPENTIN 400 MG/1
400 CAPSULE ORAL 3 TIMES DAILY
Qty: 90 CAPSULE | Refills: 2 | Status: SHIPPED | OUTPATIENT
Start: 2017-12-19 | End: 2018-04-18 | Stop reason: SDUPTHER

## 2017-12-19 RX ORDER — AMOXICILLIN AND CLAVULANATE POTASSIUM 875; 125 MG/1; MG/1
1 TABLET, FILM COATED ORAL EVERY 12 HOURS SCHEDULED
Qty: 20 TABLET | Refills: 0 | Status: SHIPPED | OUTPATIENT
Start: 2017-12-19 | End: 2017-12-29

## 2017-12-19 RX ORDER — SERTRALINE HYDROCHLORIDE 100 MG/1
100 TABLET, FILM COATED ORAL DAILY
Qty: 30 TABLET | Refills: 2 | Status: SHIPPED | OUTPATIENT
Start: 2017-12-19 | End: 2018-03-28 | Stop reason: SDUPTHER

## 2017-12-19 RX ORDER — HYDROXYZINE PAMOATE 25 MG/1
50 CAPSULE ORAL 3 TIMES DAILY PRN
Qty: 180 CAPSULE | Refills: 3 | Status: SHIPPED | OUTPATIENT
Start: 2017-12-19 | End: 2018-03-28 | Stop reason: SDUPTHER

## 2017-12-19 RX ORDER — GUAIFENESIN 600 MG/1
1200 TABLET, EXTENDED RELEASE ORAL EVERY 12 HOURS SCHEDULED
Qty: 28 TABLET | Refills: 0 | Status: SHIPPED | OUTPATIENT
Start: 2017-12-19 | End: 2018-04-18

## 2017-12-19 NOTE — PROGRESS NOTES
Subjective   Owen Hooper is a 42 y.o. female.     Cough   This is a new problem. The current episode started in the past 7 days. The problem has been gradually worsening. The problem occurs every few minutes. The cough is non-productive. Associated symptoms include chest pain, chills, ear congestion, ear pain, a fever, headaches, myalgias, nasal congestion, postnasal drip, rhinorrhea, a sore throat, shortness of breath and sweats. Pertinent negatives include no heartburn, hemoptysis, rash, weight loss or wheezing. Nothing aggravates the symptoms. Risk factors for lung disease include smoking/tobacco exposure. Treatments tried: Tylenol cold and sinus. The treatment provided mild relief.   Anxiety   Presents for follow-up visit. Symptoms include chest pain, malaise and shortness of breath. Patient reports no compulsions, confusion, decreased concentration, depressed mood, dizziness, dry mouth, excessive worry, feeling of choking, hyperventilation, impotence, insomnia, irritability, muscle tension, nausea, nervous/anxious behavior, obsessions, palpitations, panic, restlessness or suicidal ideas. Symptoms occur constantly. The severity of symptoms is mild. The quality of sleep is fair. Nighttime awakenings: occasional.     Compliance with medications is %.     She has been taking gabapentin for sciatica. She says that it does help but her sciatica has been worse lately because she has been working more to prepare for the holidays.   She denies any side effects from her medications.        Current Outpatient Prescriptions:   •  gabapentin (NEURONTIN) 400 MG capsule, Take 1 capsule by mouth 3 (Three) Times a Day., Disp: 90 capsule, Rfl: 1  •  hydrOXYzine (VISTARIL) 25 MG capsule, Take 2 capsules by mouth 3 (Three) Times a Day As Needed for Anxiety (and insomnia)., Disp: 180 capsule, Rfl: 3  •  sertraline (ZOLOFT) 100 MG tablet, Take 1 tablet by mouth Daily., Disp: 30 tablet, Rfl: 2    The following portions  "of the patient's history were reviewed and updated as appropriate: allergies, current medications, past family history, past medical history, past social history, past surgical history and problem list.    Review of Systems   Constitutional: Positive for activity change, appetite change, chills, fatigue and fever. Negative for irritability, unexpected weight change and weight loss.   HENT: Positive for ear pain, postnasal drip, rhinorrhea and sore throat.    Respiratory: Positive for cough and shortness of breath. Negative for hemoptysis and wheezing.    Cardiovascular: Positive for chest pain. Negative for palpitations and leg swelling.   Gastrointestinal: Negative for abdominal distention, abdominal pain, constipation, diarrhea, heartburn, nausea and vomiting.   Genitourinary: Negative for impotence.   Musculoskeletal: Positive for arthralgias and myalgias.   Skin: Negative for pallor, rash and wound.   Neurological: Positive for weakness, numbness and headaches. Negative for dizziness.   Psychiatric/Behavioral: Negative for confusion, decreased concentration, sleep disturbance and suicidal ideas. The patient is not nervous/anxious and does not have insomnia.        Objective    Vitals:    12/19/17 0758   BP: 122/88   Pulse: 105   Temp: 98.1 °F (36.7 °C)   SpO2: 99%   Weight: 110 kg (243 lb)   Height: 170.2 cm (67\")       Physical Exam   Constitutional: She is oriented to person, place, and time. She appears well-developed and well-nourished. No distress.   HENT:   Right Ear: Hearing, tympanic membrane, external ear and ear canal normal.   Left Ear: Hearing, tympanic membrane, external ear and ear canal normal.   Nose: Mucosal edema and rhinorrhea present. Right sinus exhibits maxillary sinus tenderness. Right sinus exhibits no frontal sinus tenderness. Left sinus exhibits maxillary sinus tenderness. Left sinus exhibits no frontal sinus tenderness.   Mouth/Throat: Posterior oropharyngeal erythema present. No " oropharyngeal exudate or posterior oropharyngeal edema.   Cardiovascular: Normal rate, regular rhythm and normal heart sounds.    No murmur heard.  No LE edema.   Pulmonary/Chest: Effort normal and breath sounds normal. No respiratory distress. She has no wheezes.   Abdominal: Soft. Bowel sounds are normal. She exhibits no distension. There is no tenderness.   Musculoskeletal:   + straight leg test on the right side   Neurological: She is alert and oriented to person, place, and time.   Psychiatric: She has a normal mood and affect. Her behavior is normal. Judgment and thought content normal.   Flat affect and appears fatigued   Nursing note and vitals reviewed.      Assessment/Plan   Problems Addressed this Visit        Nervous and Auditory    Sciatica       Other    Generalized anxiety disorder - Primary    Relevant Medications    hydrOXYzine (VISTARIL) 25 MG capsule    sertraline (ZOLOFT) 100 MG tablet      Other Visit Diagnoses     Acute non-recurrent maxillary sinusitis            1.) LO- Doing well with sertraline increase and visteril PRN.  Continue current medications and refilled. Needs to go get her labs today.  2.) Sciatica- Continue gabapentin. UDS today. The patient has read and signed the [facility] Controlled Substance Contract.  I will continue to see patient for regular follow up appointments.  They are well controlled on their medication.  ALIYAH has been reviewed by me and is updated every 3 months. The patient is aware of the potential for addiction and dependence.  3.) Sinusitis-  Will call in augmentin for 10 days.  Mucinex for congestion.   RTC in 3 months or sooner PRN

## 2018-01-04 ENCOUNTER — APPOINTMENT (OUTPATIENT)
Dept: LAB | Facility: HOSPITAL | Age: 43
End: 2018-01-04

## 2018-01-04 LAB
ALBUMIN SERPL-MCNC: 4.4 G/DL (ref 3.4–4.8)
ALBUMIN/GLOB SERPL: 1.1 G/DL (ref 1.1–1.8)
ALP SERPL-CCNC: 99 U/L (ref 38–126)
ALT SERPL W P-5'-P-CCNC: 39 U/L (ref 9–52)
ANION GAP SERPL CALCULATED.3IONS-SCNC: 10 MMOL/L (ref 5–15)
ARTICHOKE IGE QN: 111 MG/DL (ref 1–129)
AST SERPL-CCNC: 46 U/L (ref 14–36)
BILIRUB SERPL-MCNC: 0.3 MG/DL (ref 0.2–1.3)
BUN BLD-MCNC: 10 MG/DL (ref 7–21)
BUN/CREAT SERPL: 12.3 (ref 7–25)
CALCIUM SPEC-SCNC: 9.4 MG/DL (ref 8.4–10.2)
CHLORIDE SERPL-SCNC: 105 MMOL/L (ref 95–110)
CHOLEST SERPL-MCNC: 231 MG/DL (ref 0–199)
CO2 SERPL-SCNC: 26 MMOL/L (ref 22–31)
CREAT BLD-MCNC: 0.81 MG/DL (ref 0.5–1)
DEPRECATED RDW RBC AUTO: 45.6 FL (ref 36.4–46.3)
ERYTHROCYTE [DISTWIDTH] IN BLOOD BY AUTOMATED COUNT: 13.9 % (ref 11.5–14.5)
GFR SERPL CREATININE-BSD FRML MDRD: 93 ML/MIN/1.73 (ref 58–135)
GLOBULIN UR ELPH-MCNC: 3.9 GM/DL (ref 2.3–3.5)
GLUCOSE BLD-MCNC: 99 MG/DL (ref 60–100)
HBA1C MFR BLD: 5.6 % (ref 4–5.6)
HCT VFR BLD AUTO: 42.2 % (ref 35–45)
HDLC SERPL-MCNC: 34 MG/DL (ref 60–200)
HGB BLD-MCNC: 13.9 G/DL (ref 12–15.5)
LDLC/HDLC SERPL: 4.19 {RATIO} (ref 0–3.22)
MCH RBC QN AUTO: 29.7 PG (ref 26.5–34)
MCHC RBC AUTO-ENTMCNC: 32.9 G/DL (ref 31.4–36)
MCV RBC AUTO: 90.2 FL (ref 80–98)
PLATELET # BLD AUTO: 365 10*3/MM3 (ref 150–450)
PMV BLD AUTO: 11.2 FL (ref 8–12)
POTASSIUM BLD-SCNC: 3.9 MMOL/L (ref 3.5–5.1)
PROT SERPL-MCNC: 8.3 G/DL (ref 6.3–8.6)
RBC # BLD AUTO: 4.68 10*6/MM3 (ref 3.77–5.16)
SODIUM BLD-SCNC: 141 MMOL/L (ref 137–145)
TRIGL SERPL-MCNC: 273 MG/DL (ref 20–199)
TSH SERPL DL<=0.05 MIU/L-ACNC: 1.9 MIU/ML (ref 0.46–4.68)
WBC NRBC COR # BLD: 10.48 10*3/MM3 (ref 3.2–9.8)

## 2018-01-04 PROCEDURE — 83036 HEMOGLOBIN GLYCOSYLATED A1C: CPT | Performed by: FAMILY MEDICINE

## 2018-01-04 PROCEDURE — 85027 COMPLETE CBC AUTOMATED: CPT | Performed by: FAMILY MEDICINE

## 2018-01-04 PROCEDURE — 84443 ASSAY THYROID STIM HORMONE: CPT | Performed by: FAMILY MEDICINE

## 2018-01-04 PROCEDURE — 80061 LIPID PANEL: CPT | Performed by: FAMILY MEDICINE

## 2018-01-04 PROCEDURE — 36415 COLL VENOUS BLD VENIPUNCTURE: CPT | Performed by: FAMILY MEDICINE

## 2018-01-04 PROCEDURE — 80053 COMPREHEN METABOLIC PANEL: CPT | Performed by: FAMILY MEDICINE

## 2018-01-05 ENCOUNTER — TELEPHONE (OUTPATIENT)
Dept: FAMILY MEDICINE CLINIC | Facility: CLINIC | Age: 43
End: 2018-01-05

## 2018-01-05 PROBLEM — E78.2 MIXED HYPERLIPIDEMIA: Status: ACTIVE | Noted: 2018-01-05

## 2018-01-05 NOTE — TELEPHONE ENCOUNTER
Pr Dr. HENRY Land, Ms. Hooper has been called with her recent lab results & recommendations.  Continue her current medications and follow-up as planned or sooner if any problems.      ----- Message from aFbi Land MD sent at 1/5/2018 12:13 PM CST -----  Please let her know that her thyroid looked normal.  Her screening for diabetes was negative.  CBC overall normal.  Her AST is slightly elevated, but other LFT normal. WIll recheck when she comes back in. May be nothing or even lab error.  Cholesterol is elevated, but based on CV risk she doesn't need medication. She needs to watch saturated fats and fatty foods in her diet.  Will monitor annually.    Follow-up as planned or sooner PRN.

## 2018-03-28 ENCOUNTER — TELEPHONE (OUTPATIENT)
Dept: FAMILY MEDICINE CLINIC | Facility: CLINIC | Age: 43
End: 2018-03-28

## 2018-03-28 RX ORDER — SERTRALINE HYDROCHLORIDE 100 MG/1
100 TABLET, FILM COATED ORAL DAILY
Qty: 30 TABLET | Refills: 2 | Status: SHIPPED | OUTPATIENT
Start: 2018-03-28 | End: 2018-06-27 | Stop reason: SDUPTHER

## 2018-03-28 RX ORDER — HYDROXYZINE PAMOATE 25 MG/1
50 CAPSULE ORAL 3 TIMES DAILY PRN
Qty: 180 CAPSULE | Refills: 3 | Status: SHIPPED | OUTPATIENT
Start: 2018-03-28 | End: 2018-04-18 | Stop reason: DRUGHIGH

## 2018-03-28 NOTE — TELEPHONE ENCOUNTER
Requested Refills Sent      ----- Message from Kiana Linares sent at 3/28/2018  1:08 PM CDT -----  Contact: PT CALLED  Pt needs refill of Sertraline and Hydroxyzine sent to [facility]

## 2018-03-29 ENCOUNTER — DOCUMENTATION (OUTPATIENT)
Dept: FAMILY MEDICINE CLINIC | Facility: CLINIC | Age: 43
End: 2018-03-29

## 2018-03-29 NOTE — PROGRESS NOTES
Called in prescription for gabapentin to [facility] pharmacy for gabapentin 400mg capsule tid. #90 no refills

## 2018-04-18 ENCOUNTER — OFFICE VISIT (OUTPATIENT)
Dept: FAMILY MEDICINE CLINIC | Facility: CLINIC | Age: 43
End: 2018-04-18

## 2018-04-18 VITALS
SYSTOLIC BLOOD PRESSURE: 122 MMHG | DIASTOLIC BLOOD PRESSURE: 92 MMHG | BODY MASS INDEX: 39.55 KG/M2 | WEIGHT: 252 LBS | HEIGHT: 67 IN

## 2018-04-18 DIAGNOSIS — G47.00 INSOMNIA, UNSPECIFIED TYPE: ICD-10-CM

## 2018-04-18 DIAGNOSIS — M54.31 SCIATICA OF RIGHT SIDE: Primary | ICD-10-CM

## 2018-04-18 DIAGNOSIS — M53.3 SACROILIAC JOINT DYSFUNCTION OF RIGHT SIDE: ICD-10-CM

## 2018-04-18 DIAGNOSIS — L23.9 ALLERGIC CONTACT DERMATITIS, UNSPECIFIED TRIGGER: ICD-10-CM

## 2018-04-18 PROCEDURE — 99214 OFFICE O/P EST MOD 30 MIN: CPT | Performed by: FAMILY MEDICINE

## 2018-04-18 RX ORDER — CLOTRIMAZOLE AND BETAMETHASONE DIPROPIONATE 10; .64 MG/G; MG/G
CREAM TOPICAL EVERY 12 HOURS SCHEDULED
Qty: 45 G | Refills: 0 | Status: SHIPPED | OUTPATIENT
Start: 2018-04-18 | End: 2018-05-17

## 2018-04-18 RX ORDER — HYDROXYZINE PAMOATE 50 MG/1
50 CAPSULE ORAL NIGHTLY PRN
Qty: 90 CAPSULE | Refills: 1 | Status: SHIPPED | OUTPATIENT
Start: 2018-04-18 | End: 2018-06-27 | Stop reason: SDUPTHER

## 2018-04-18 RX ORDER — GABAPENTIN 400 MG/1
400 CAPSULE ORAL 3 TIMES DAILY
Qty: 90 CAPSULE | Refills: 2 | Status: SHIPPED | OUTPATIENT
Start: 2018-04-18 | End: 2018-04-26 | Stop reason: SDUPTHER

## 2018-04-18 NOTE — PATIENT INSTRUCTIONS
Sacroiliac Joint Dysfunction  Sacroiliac joint dysfunction is a condition that causes inflammation on one or both sides of the sacroiliac (SI) joint. The SI joint connects the lower part of the spine (sacrum) with the two upper portions of the pelvis (ilium). This condition causes deep aching or burning pain in the low back. In some cases, the pain may also spread into one or both buttocks or hips or spread down the legs.  What are the causes?  This condition may be caused by:  · Pregnancy. During pregnancy, extra stress is put on the SI joints because the pelvis widens.  · Injury, such as:  ¨ Car accidents.  ¨ Sport-related injuries.  ¨ Work-related injuries.  · Having one leg that is shorter than the other.  · Conditions that affect the joints, such as:  ¨ Rheumatoid arthritis.  ¨ Gout.  ¨ Psoriatic arthritis.  ¨ Joint infection (septic arthritis).  Sometimes, the cause of SI joint dysfunction is not known.  What are the signs or symptoms?  Symptoms of this condition include:  · Aching or burning pain in the lower back. The pain may also spread to other areas, such as:  ¨ Buttocks.  ¨ Groin.  ¨ Thighs and legs.  · Muscle spasms in or around the painful areas.  · Increased pain when standing, walking, running, stair climbing, bending, or lifting.  How is this diagnosed?  Your health care provider will do a physical exam and take your medical history. During the exam, the health care provider may move one or both of your legs to different positions to check for pain. Various tests may be done to help verify the diagnosis, including:  · Imaging tests to look for other causes of pain. These may include:  ¨ MRI.  ¨ CT scan.  ¨ Bone scan.  · Diagnostic injection. A numbing medicine is injected into the SI joint using a needle. If the pain is temporarily improved or stopped after the injection, this can indicate that SI joint dysfunction is the problem.  How is this treated?  Treatment may vary depending on the cause and  severity of your condition. Treatment options may include:  · Applying ice or heat to the lower back area. This can help to reduce pain and muscle spasms.  · Medicines to relieve pain or inflammation or to relax the muscles.  · Wearing a back brace (sacroiliac brace) to help support the joint while your back is healing.  · Physical therapy to increase muscle strength around the joint and flexibility at the joint. This may also involve learning proper body positions and ways of moving to relieve stress on the joint.  · Direct manipulation of the SI joint.  · Injections of steroid medicine into the joint in order to reduce pain and swelling.  · Radiofrequency ablation to burn away nerves that are carrying pain messages from the joint.  · Use of a device that provides electrical stimulation in order to reduce pain at the joint.  · Surgery to put in screws and plates that limit or prevent joint motion. This is rare.  Follow these instructions at home:  · Rest as needed. Limit your activities as directed by your health care provider.  · Take medicines only as directed by your health care provider.  · If directed, apply ice to the affected area:  ¨ Put ice in a plastic bag.  ¨ Place a towel between your skin and the bag.  ¨ Leave the ice on for 20 minutes, 2-3 times per day.  · Use a heating pad or a moist heat pack as directed by your health care provider.  · Exercise as directed by your health care provider or physical therapist.  · Keep all follow-up visits as directed by your health care provider. This is important.  Contact a health care provider if:  · Your pain is not controlled with medicine.  · You have a fever.  · You have increasingly severe pain.  Get help right away if:  · You have weakness, numbness, or tingling in your legs or feet.  · You lose control of your bladder or bowel.  This information is not intended to replace advice given to you by your health care provider. Make sure you discuss any questions  you have with your health care provider.  Document Released: 03/16/2010 Document Revised: 05/25/2017 Document Reviewed: 08/25/2015  Elsevier Interactive Patient Education © 2017 [employer]

## 2018-04-26 RX ORDER — GABAPENTIN 400 MG/1
400 CAPSULE ORAL 3 TIMES DAILY
Qty: 90 CAPSULE | Refills: 2 | Status: SHIPPED | OUTPATIENT
Start: 2018-04-26 | End: 2018-06-27 | Stop reason: SDUPTHER

## 2018-04-26 RX ORDER — SERTRALINE HYDROCHLORIDE 100 MG/1
100 TABLET, FILM COATED ORAL DAILY
Qty: 30 TABLET | Refills: 5 | Status: SHIPPED | OUTPATIENT
Start: 2018-04-26 | End: 2018-05-17 | Stop reason: SDUPTHER

## 2018-04-26 RX ORDER — GABAPENTIN 400 MG/1
CAPSULE ORAL
Qty: 90 CAPSULE | Refills: 0 | OUTPATIENT
Start: 2018-04-26

## 2018-05-01 ENCOUNTER — HOSPITAL ENCOUNTER (EMERGENCY)
Facility: HOSPITAL | Age: 43
Discharge: HOME | End: 2018-05-01
Attending: EMERGENCY MEDICINE | Admitting: EMERGENCY MEDICINE

## 2018-05-01 VITALS
DIASTOLIC BLOOD PRESSURE: 95 MMHG | WEIGHT: 245 LBS | OXYGEN SATURATION: 97 % | BODY MASS INDEX: 38.45 KG/M2 | TEMPERATURE: 98.8 F | HEART RATE: 95 BPM | RESPIRATION RATE: 18 BRPM | HEIGHT: 67 IN | SYSTOLIC BLOOD PRESSURE: 137 MMHG

## 2018-05-01 DIAGNOSIS — L02.411 ABSCESS OF AXILLA, RIGHT: Primary | ICD-10-CM

## 2018-05-01 PROCEDURE — 99283 EMERGENCY DEPT VISIT LOW MDM: CPT

## 2018-05-01 RX ORDER — HYDROCODONE BITARTRATE AND ACETAMINOPHEN 10; 325 MG/1; MG/1
1 TABLET ORAL ONCE
Status: COMPLETED | OUTPATIENT
Start: 2018-05-01 | End: 2018-05-01

## 2018-05-01 RX ORDER — DOXYCYCLINE 100 MG/1
100 CAPSULE ORAL 2 TIMES DAILY
Qty: 20 CAPSULE | Refills: 0 | Status: SHIPPED | OUTPATIENT
Start: 2018-05-01 | End: 2018-05-11

## 2018-05-01 RX ADMIN — HYDROCODONE BITARTRATE AND ACETAMINOPHEN 1 TABLET: 10; 325 TABLET ORAL at 12:17

## 2018-05-01 NOTE — ED PROVIDER NOTES
Subjective     History provided by:  Patient   used: No    Abscess   Location:  Shoulder/arm  Shoulder/arm abscess location:  R axilla  Size:  3cm  Abscess quality: induration, painful, redness and warmth    Abscess quality: not draining, no fluctuance, no itching and not weeping    Red streaking: no    Duration:  1 week  Progression:  Worsening  Pain details:     Quality:  Throbbing    Severity:  Mild    Duration:  1 week    Timing:  Constant    Progression:  Worsening  Chronicity:  Recurrent  Context: not diabetes, not immunosuppression, not injected drug use, not insect bite/sting and not skin injury    Relieved by:  Nothing  Ineffective treatments:  None tried  Associated symptoms: no anorexia, no fatigue, no fever, no headaches, no nausea and no vomiting    Risk factors: prior abscess    Risk factors: no family hx of MRSA and no hx of MRSA        Review of Systems   Constitutional: Negative.  Negative for fatigue and fever.   HENT: Negative.    Eyes: Negative.    Respiratory: Negative.    Cardiovascular: Negative.    Gastrointestinal: Negative.  Negative for anorexia, nausea and vomiting.   Endocrine: Negative.    Genitourinary: Negative.    Musculoskeletal: Negative.    Skin: Negative for color change, pallor, rash and wound.        Abscess right axilla     Allergic/Immunologic: Negative.    Neurological: Negative.  Negative for headaches.   Hematological: Negative.    Psychiatric/Behavioral: Negative.        Past Medical History:   Diagnosis Date   • Carpal tunnel syndrome     right   • Hypertension    • Major depressive disorder    • Tobacco use disorder, severe, dependence        No Known Allergies    Past Surgical History:   Procedure Laterality Date   • CARPAL TUNNEL RELEASE  right   •  SECTION     • HYSTERECTOMY     • TONSILLECTOMY     • TRIGGER FINGER RELEASE Right     right ring finger       History reviewed. No pertinent family history.    Social History     Social History    • Marital status: Single     Social History Main Topics   • Smoking status: Current Every Day Smoker     Packs/day: 0.50     Types: Cigarettes   • Smokeless tobacco: Never Used   • Alcohol use Yes      Comment: occasional   • Drug use: No   • Sexual activity: Defer     Other Topics Concern   • Not on file           Objective   Physical Exam   Constitutional: She is oriented to person, place, and time. She appears well-developed and well-nourished. No distress.   HENT:   Head: Normocephalic and atraumatic.   Mouth/Throat: No oropharyngeal exudate.   Eyes: Conjunctivae and EOM are normal. Pupils are equal, round, and reactive to light. Right eye exhibits no discharge. Left eye exhibits no discharge. No scleral icterus.   Neck: Normal range of motion. Neck supple. No JVD present. No tracheal deviation present. No thyromegaly present.   Cardiovascular: Normal rate, regular rhythm, normal heart sounds and intact distal pulses.  Exam reveals no gallop and no friction rub.    No murmur heard.  Pulmonary/Chest: Effort normal and breath sounds normal. No stridor. No respiratory distress. She has no wheezes. She has no rales. She exhibits no tenderness.   Abdominal: Soft. Bowel sounds are normal. She exhibits no distension and no mass. There is no tenderness. There is no rebound and no guarding. No hernia.   Musculoskeletal: Normal range of motion. She exhibits no edema, tenderness or deformity.   Lymphadenopathy:     She has no cervical adenopathy.   Neurological: She is alert and oriented to person, place, and time. She has normal reflexes. She displays normal reflexes. No cranial nerve deficit. She exhibits normal muscle tone. Coordination normal.   Skin: Skin is warm and dry. Capillary refill takes less than 2 seconds. No abrasion, no bruising, no burn, no ecchymosis, no laceration, no lesion, no petechiae, no purpura and no rash noted. Rash is not macular, not papular, not maculopapular, not nodular, not pustular, not  vesicular and not urticarial. She is not diaphoretic. There is erythema. No cyanosis. No pallor. Nails show no clubbing.   approx 3cm abscess noted to right axilla. No fluctuance or drainage noted. Induration and tenderness noted to right axilla. No red streaking.    Psychiatric: She has a normal mood and affect. Her behavior is normal. Judgment and thought content normal.   Nursing note and vitals reviewed.      Procedures           ED Course  ED Course                  MDM    ED Disposition     ED Disposition Condition Comment    Discharge Stable           Your Follow-Up Providers     Fabi Land MD. Schedule an appointment as soon as possible for a visit in 1 day.    Specialty:  Family Medicine  Contact information:  [address] [place] [address] [phone]                   Final diagnoses:   Abscess of axilla, right            JOAQUIM Wiley  05/01/18 2103

## 2018-05-01 NOTE — ED NOTES
ER, PA informed this RN that pt was not in room upon PA arrival to room.     Arianna Enriquez RN  05/01/18 5468

## 2018-05-17 ENCOUNTER — OFFICE VISIT (OUTPATIENT)
Dept: SURGERY | Facility: CLINIC | Age: 43
End: 2018-05-17

## 2018-05-17 ENCOUNTER — APPOINTMENT (OUTPATIENT)
Dept: PREADMISSION TESTING | Facility: HOSPITAL | Age: 43
End: 2018-05-17

## 2018-05-17 VITALS
SYSTOLIC BLOOD PRESSURE: 128 MMHG | RESPIRATION RATE: 18 BRPM | WEIGHT: 250 LBS | OXYGEN SATURATION: 97 % | DIASTOLIC BLOOD PRESSURE: 88 MMHG | HEIGHT: 67 IN | HEART RATE: 93 BPM | BODY MASS INDEX: 39.24 KG/M2

## 2018-05-17 VITALS
BODY MASS INDEX: 39.39 KG/M2 | DIASTOLIC BLOOD PRESSURE: 88 MMHG | HEIGHT: 67 IN | WEIGHT: 251 LBS | SYSTOLIC BLOOD PRESSURE: 132 MMHG

## 2018-05-17 DIAGNOSIS — L73.2 HIDRADENITIS SUPPURATIVA OF RIGHT AXILLA: Primary | ICD-10-CM

## 2018-05-17 PROCEDURE — 99204 OFFICE O/P NEW MOD 45 MIN: CPT | Performed by: SURGERY

## 2018-05-17 RX ORDER — HYDROXYZINE PAMOATE 25 MG/1
1 CAPSULE ORAL NIGHTLY
Refills: 3 | COMMUNITY
Start: 2018-04-26 | End: 2018-05-17

## 2018-05-17 RX ORDER — SODIUM CHLORIDE 9 MG/ML
100 INJECTION, SOLUTION INTRAVENOUS CONTINUOUS
Status: CANCELLED | OUTPATIENT
Start: 2018-05-21

## 2018-05-17 NOTE — PROGRESS NOTES
CHIEF COMPLAINT:    Right Axillary Swelling and drainage    HISTORY OF PRESENT ILLNESS:    Owen Hooper is a 42 y.o. female who has had recurring infections in the right axilla several times per year for many years.  For the past two weeks she has had swelling and drainage from the same site in her right axilla.  She has had spontaneous drainage as well.  She has taken a course of doxycycline for this.  She reports no fevers or chills.  She continues to have pain and swelling in this area.  She has not been shaving her armpits and is using spray deodorant.    Past Medical History:   Diagnosis Date   • Carpal tunnel syndrome     right   • Hypertension    • Major depressive disorder    • Tobacco use disorder, severe, dependence        Past Surgical History:   Procedure Laterality Date   • CARPAL TUNNEL RELEASE  right   •  SECTION     • HYSTERECTOMY     • TONSILLECTOMY     • TRIGGER FINGER RELEASE Right     right ring finger       Prior to Admission medications    Medication Sig Start Date End Date Taking? Authorizing Provider   gabapentin (NEURONTIN) 400 MG capsule Take 1 capsule by mouth 3 (Three) Times a Day. 18  Yes Fabi Land MD   sertraline (ZOLOFT) 100 MG tablet Take 1 tablet by mouth Daily. 3/28/18  Yes Fabi Land MD   sertraline (ZOLOFT) 100 MG tablet TAKE 1 TABLET BY MOUTH DAILY 18 Yes Fabi Land MD   hydrOXYzine (VISTARIL) 25 MG capsule Take 1 capsule by mouth Every Night. 18   Historical Provider, MD   hydrOXYzine (VISTARIL) 50 MG capsule Take 1 capsule by mouth At Night As Needed for Anxiety. 18   Fabi Land MD   clotrimazole-betamethasone (LOTRISONE) 1-0.05 % cream Apply  topically Every 12 (Twelve) Hours. 18  Fabi Land MD   doxycycline (VIBRAMYCIN) 100 MG capsule Take 100 mg by mouth 2 (Two) Times a Day.  18  Nurse [facility], RN       No Known Allergies    No family history  "on file.    Social History     Social History   • Marital status: Single     Spouse name: N/A   • Number of children: N/A   • Years of education: N/A     Occupational History   • Not on file.     Social History Main Topics   • Smoking status: Current Every Day Smoker     Packs/day: 0.50     Types: Cigarettes   • Smokeless tobacco: Never Used   • Alcohol use Yes      Comment: occasional   • Drug use: No   • Sexual activity: Defer     Other Topics Concern   • Not on file     Social History Narrative   • No narrative on file       Review of Systems   Constitutional: Negative for appetite change, chills, fever and unexpected weight change.   HENT: Negative for hearing loss, nosebleeds and trouble swallowing.    Eyes: Negative for visual disturbance.   Respiratory: Negative for apnea, cough, choking, chest tightness, shortness of breath, wheezing and stridor.    Cardiovascular: Negative for chest pain, palpitations and leg swelling.   Gastrointestinal: Negative for abdominal distention, abdominal pain, blood in stool, constipation, diarrhea, nausea and vomiting.   Endocrine: Negative for cold intolerance, heat intolerance, polydipsia, polyphagia and polyuria.   Genitourinary: Negative for difficulty urinating, dysuria, frequency, hematuria and urgency.   Musculoskeletal: Negative for arthralgias, back pain, myalgias and neck pain.   Skin: Negative for color change, pallor and rash.        Right axillary abscess   Allergic/Immunologic: Negative for immunocompromised state.   Neurological: Positive for headaches. Negative for dizziness, seizures, syncope, light-headedness and numbness.   Hematological: Negative for adenopathy.   Psychiatric/Behavioral: Negative for suicidal ideas. The patient is not nervous/anxious.        Objective     /88   Ht 170.2 cm (67\")   Wt 114 kg (251 lb)   LMP  (LMP Unknown)   BMI 39.31 kg/m²     Physical Exam   Constitutional: She is oriented to person, place, and time. She appears " well-developed and well-nourished. No distress.   HENT:   Head: Normocephalic and atraumatic.   Eyes: Conjunctivae and EOM are normal. Pupils are equal, round, and reactive to light. Right eye exhibits no discharge. Left eye exhibits no discharge.   Neck: Normal range of motion. Neck supple. No JVD present. No tracheal deviation present. No thyromegaly present.   Cardiovascular: Normal rate, regular rhythm and normal heart sounds.  Exam reveals no gallop and no friction rub.    No murmur heard.  Pulmonary/Chest: Effort normal and breath sounds normal. No respiratory distress. She has no wheezes. She has no rales. She exhibits no tenderness.   Abdominal: Soft. She exhibits no distension and no mass. There is no tenderness. There is no rebound and no guarding. No hernia.   Musculoskeletal: Normal range of motion. She exhibits no edema, tenderness or deformity.   Neurological: She is alert and oriented to person, place, and time. No cranial nerve deficit.   Skin: Skin is warm and dry. No rash noted. She is not diaphoretic. No erythema. No pallor.        Psychiatric: She has a normal mood and affect. Her behavior is normal. Judgment and thought content normal.         ASSESSMENT:    Right Axillary Hidradenitis    PLAN:    She has chronic hidradenitis of the right axilla.  The groin and left axilla are unaffected. She will need operative drainage and debridement of the right axilla.  The risks and benefits of right axillary hidradenitis excision have been discussed.  Scheduled for next week.          This document has been electronically signed by Juan Ramon Jefferson MD on May 17, 2018 9:49 AM

## 2018-05-18 ENCOUNTER — ANESTHESIA EVENT (OUTPATIENT)
Dept: PERIOP | Facility: HOSPITAL | Age: 43
End: 2018-05-18

## 2018-05-21 ENCOUNTER — HOSPITAL ENCOUNTER (OUTPATIENT)
Facility: HOSPITAL | Age: 43
Setting detail: HOSPITAL OUTPATIENT SURGERY
Discharge: HOME | End: 2018-05-21
Attending: SURGERY | Admitting: SURGERY

## 2018-05-21 ENCOUNTER — ANESTHESIA (OUTPATIENT)
Dept: PERIOP | Facility: HOSPITAL | Age: 43
End: 2018-05-21

## 2018-05-21 VITALS
TEMPERATURE: 97.4 F | RESPIRATION RATE: 20 BRPM | HEIGHT: 67 IN | HEART RATE: 86 BPM | OXYGEN SATURATION: 98 % | BODY MASS INDEX: 39.41 KG/M2 | WEIGHT: 251.1 LBS | DIASTOLIC BLOOD PRESSURE: 79 MMHG | SYSTOLIC BLOOD PRESSURE: 149 MMHG

## 2018-05-21 DIAGNOSIS — L73.2 HIDRADENITIS SUPPURATIVA OF RIGHT AXILLA: ICD-10-CM

## 2018-05-21 PROCEDURE — 25010000002 SUCCINYLCHOLINE PER 20 MG: Performed by: NURSE ANESTHETIST, CERTIFIED REGISTERED

## 2018-05-21 PROCEDURE — 11450 EXC SKN HDRDNT AX SMPL/NTRM: CPT | Performed by: SURGERY

## 2018-05-21 PROCEDURE — 88304 TISSUE EXAM BY PATHOLOGIST: CPT | Performed by: SURGERY

## 2018-05-21 PROCEDURE — 25010000002 DEXAMETHASONE PER 1 MG: Performed by: NURSE ANESTHETIST, CERTIFIED REGISTERED

## 2018-05-21 PROCEDURE — 25010000003 CEFAZOLIN PER 500 MG: Performed by: SURGERY

## 2018-05-21 PROCEDURE — 25010000002 MIDAZOLAM PER 1 MG: Performed by: NURSE ANESTHETIST, CERTIFIED REGISTERED

## 2018-05-21 PROCEDURE — 88304 TISSUE EXAM BY PATHOLOGIST: CPT | Performed by: PATHOLOGY

## 2018-05-21 PROCEDURE — 25010000002 ONDANSETRON PER 1 MG: Performed by: NURSE ANESTHETIST, CERTIFIED REGISTERED

## 2018-05-21 PROCEDURE — 25010000002 PROPOFOL 10 MG/ML EMULSION: Performed by: NURSE ANESTHETIST, CERTIFIED REGISTERED

## 2018-05-21 PROCEDURE — 25010000002 FENTANYL CITRATE (PF) 100 MCG/2ML SOLUTION: Performed by: NURSE ANESTHETIST, CERTIFIED REGISTERED

## 2018-05-21 PROCEDURE — 25010000002 NEOSTIGMINE 4 MG/4ML SOLUTION PREFILLED SYRINGE: Performed by: NURSE ANESTHETIST, CERTIFIED REGISTERED

## 2018-05-21 RX ORDER — FLUMAZENIL 0.1 MG/ML
0.2 INJECTION INTRAVENOUS AS NEEDED
Status: DISCONTINUED | OUTPATIENT
Start: 2018-05-21 | End: 2018-05-21 | Stop reason: HOSPADM

## 2018-05-21 RX ORDER — NEOSTIGMINE METHYLSULFATE 4 MG/4 ML
SYRINGE (ML) INTRAVENOUS AS NEEDED
Status: DISCONTINUED | OUTPATIENT
Start: 2018-05-21 | End: 2018-05-21 | Stop reason: SURG

## 2018-05-21 RX ORDER — PROPOFOL 10 MG/ML
VIAL (ML) INTRAVENOUS AS NEEDED
Status: DISCONTINUED | OUTPATIENT
Start: 2018-05-21 | End: 2018-05-21 | Stop reason: SURG

## 2018-05-21 RX ORDER — LIDOCAINE HYDROCHLORIDE 20 MG/ML
INJECTION, SOLUTION INFILTRATION; PERINEURAL AS NEEDED
Status: DISCONTINUED | OUTPATIENT
Start: 2018-05-21 | End: 2018-05-21 | Stop reason: SURG

## 2018-05-21 RX ORDER — MIDAZOLAM HYDROCHLORIDE 1 MG/ML
INJECTION INTRAMUSCULAR; INTRAVENOUS AS NEEDED
Status: DISCONTINUED | OUTPATIENT
Start: 2018-05-21 | End: 2018-05-21 | Stop reason: SURG

## 2018-05-21 RX ORDER — ONDANSETRON 2 MG/ML
INJECTION INTRAMUSCULAR; INTRAVENOUS AS NEEDED
Status: DISCONTINUED | OUTPATIENT
Start: 2018-05-21 | End: 2018-05-21 | Stop reason: SURG

## 2018-05-21 RX ORDER — GLYCOPYRROLATE 0.2 MG/ML
INJECTION INTRAMUSCULAR; INTRAVENOUS AS NEEDED
Status: DISCONTINUED | OUTPATIENT
Start: 2018-05-21 | End: 2018-05-21 | Stop reason: SURG

## 2018-05-21 RX ORDER — SUCCINYLCHOLINE CHLORIDE 20 MG/ML
INJECTION INTRAMUSCULAR; INTRAVENOUS AS NEEDED
Status: DISCONTINUED | OUTPATIENT
Start: 2018-05-21 | End: 2018-05-21 | Stop reason: SURG

## 2018-05-21 RX ORDER — ROCURONIUM BROMIDE 10 MG/ML
INJECTION, SOLUTION INTRAVENOUS AS NEEDED
Status: DISCONTINUED | OUTPATIENT
Start: 2018-05-21 | End: 2018-05-21 | Stop reason: SURG

## 2018-05-21 RX ORDER — DIPHENHYDRAMINE HYDROCHLORIDE 50 MG/ML
12.5 INJECTION INTRAMUSCULAR; INTRAVENOUS
Status: DISCONTINUED | OUTPATIENT
Start: 2018-05-21 | End: 2018-05-21 | Stop reason: HOSPADM

## 2018-05-21 RX ORDER — FENTANYL CITRATE 50 UG/ML
INJECTION, SOLUTION INTRAMUSCULAR; INTRAVENOUS AS NEEDED
Status: DISCONTINUED | OUTPATIENT
Start: 2018-05-21 | End: 2018-05-21 | Stop reason: SURG

## 2018-05-21 RX ORDER — NALOXONE HCL 0.4 MG/ML
0.2 VIAL (ML) INJECTION AS NEEDED
Status: DISCONTINUED | OUTPATIENT
Start: 2018-05-21 | End: 2018-05-21 | Stop reason: HOSPADM

## 2018-05-21 RX ORDER — ACETAMINOPHEN 650 MG/1
650 SUPPOSITORY RECTAL ONCE AS NEEDED
Status: DISCONTINUED | OUTPATIENT
Start: 2018-05-21 | End: 2018-05-21 | Stop reason: HOSPADM

## 2018-05-21 RX ORDER — DEXAMETHASONE SODIUM PHOSPHATE 4 MG/ML
INJECTION, SOLUTION INTRA-ARTICULAR; INTRALESIONAL; INTRAMUSCULAR; INTRAVENOUS; SOFT TISSUE AS NEEDED
Status: DISCONTINUED | OUTPATIENT
Start: 2018-05-21 | End: 2018-05-21 | Stop reason: SURG

## 2018-05-21 RX ORDER — HYDROCODONE BITARTRATE AND ACETAMINOPHEN 5; 325 MG/1; MG/1
1-2 TABLET ORAL EVERY 4 HOURS PRN
Qty: 30 TABLET | Refills: 0 | Status: SHIPPED | OUTPATIENT
Start: 2018-05-21 | End: 2018-09-13

## 2018-05-21 RX ORDER — LABETALOL HYDROCHLORIDE 5 MG/ML
5 INJECTION, SOLUTION INTRAVENOUS
Status: DISCONTINUED | OUTPATIENT
Start: 2018-05-21 | End: 2018-05-21 | Stop reason: HOSPADM

## 2018-05-21 RX ORDER — EPHEDRINE SULFATE 50 MG/ML
5 INJECTION, SOLUTION INTRAVENOUS ONCE AS NEEDED
Status: DISCONTINUED | OUTPATIENT
Start: 2018-05-21 | End: 2018-05-21 | Stop reason: HOSPADM

## 2018-05-21 RX ORDER — MEPERIDINE HYDROCHLORIDE 50 MG/ML
12.5 INJECTION INTRAMUSCULAR; INTRAVENOUS; SUBCUTANEOUS
Status: DISCONTINUED | OUTPATIENT
Start: 2018-05-21 | End: 2018-05-21 | Stop reason: HOSPADM

## 2018-05-21 RX ORDER — ACETAMINOPHEN 325 MG/1
650 TABLET ORAL ONCE AS NEEDED
Status: DISCONTINUED | OUTPATIENT
Start: 2018-05-21 | End: 2018-05-21 | Stop reason: HOSPADM

## 2018-05-21 RX ORDER — ONDANSETRON 2 MG/ML
4 INJECTION INTRAMUSCULAR; INTRAVENOUS ONCE AS NEEDED
Status: DISCONTINUED | OUTPATIENT
Start: 2018-05-21 | End: 2018-05-21 | Stop reason: HOSPADM

## 2018-05-21 RX ORDER — BUPIVACAINE HYDROCHLORIDE AND EPINEPHRINE 5; 5 MG/ML; UG/ML
INJECTION, SOLUTION EPIDURAL; INTRACAUDAL; PERINEURAL AS NEEDED
Status: DISCONTINUED | OUTPATIENT
Start: 2018-05-21 | End: 2018-05-21 | Stop reason: HOSPADM

## 2018-05-21 RX ORDER — SODIUM CHLORIDE 9 MG/ML
100 INJECTION, SOLUTION INTRAVENOUS CONTINUOUS
Status: DISCONTINUED | OUTPATIENT
Start: 2018-05-21 | End: 2018-05-21 | Stop reason: HOSPADM

## 2018-05-21 RX ADMIN — DEXAMETHASONE SODIUM PHOSPHATE 4 MG: 4 INJECTION, SOLUTION INTRAMUSCULAR; INTRAVENOUS at 13:35

## 2018-05-21 RX ADMIN — SODIUM CHLORIDE: 900 INJECTION, SOLUTION INTRAVENOUS at 13:11

## 2018-05-21 RX ADMIN — LIDOCAINE HYDROCHLORIDE 60 MG: 20 INJECTION, SOLUTION INFILTRATION; PERINEURAL at 13:16

## 2018-05-21 RX ADMIN — FENTANYL CITRATE 50 MCG: 50 INJECTION, SOLUTION INTRAMUSCULAR; INTRAVENOUS at 13:20

## 2018-05-21 RX ADMIN — ROCURONIUM BROMIDE 25 MG: 10 INJECTION INTRAVENOUS at 13:23

## 2018-05-21 RX ADMIN — MIDAZOLAM 1 MG: 1 INJECTION INTRAMUSCULAR; INTRAVENOUS at 13:04

## 2018-05-21 RX ADMIN — Medication 3 MG: at 13:45

## 2018-05-21 RX ADMIN — CEFAZOLIN SODIUM 2 G: 1 INJECTION, POWDER, FOR SOLUTION INTRAMUSCULAR; INTRAVENOUS at 13:21

## 2018-05-21 RX ADMIN — GLYCOPYRROLATE 0.4 MG: 0.2 INJECTION, SOLUTION INTRAMUSCULAR; INTRAVENOUS at 13:45

## 2018-05-21 RX ADMIN — ONDANSETRON 4 MG: 2 INJECTION INTRAMUSCULAR; INTRAVENOUS at 13:35

## 2018-05-21 RX ADMIN — ROCURONIUM BROMIDE 5 MG: 10 INJECTION INTRAVENOUS at 13:16

## 2018-05-21 RX ADMIN — FENTANYL CITRATE 100 MCG: 50 INJECTION, SOLUTION INTRAMUSCULAR; INTRAVENOUS at 13:16

## 2018-05-21 RX ADMIN — SUCCINYLCHOLINE CHLORIDE 160 MG: 20 INJECTION, SOLUTION INTRAMUSCULAR; INTRAVENOUS at 13:16

## 2018-05-21 RX ADMIN — PROPOFOL 200 MG: 10 INJECTION, EMULSION INTRAVENOUS at 13:16

## 2018-05-21 RX ADMIN — MIDAZOLAM 1 MG: 1 INJECTION INTRAMUSCULAR; INTRAVENOUS at 13:12

## 2018-05-21 NOTE — OP NOTE
Operative Note    Owen Hooper  5/21/2018    Pre-op Diagnosis:   Hidradenitis suppurativa of right axilla [L73.2]    Post-op Diagnosis:     Post-Op Diagnosis Codes:     * Hidradenitis suppurativa of right axilla [L73.2]    Procedure/CPT® Codes:      Procedure(s):  EXCISION OF RIGHT AXILLARY HIDRADENITIS    Surgeon(s):  Juan Ramon Jefferson MD    Anesthesia: General    Staff:   Circulator: Hollie Guerrero RN  Scrub Person: Mariaelena Eason  Assistant: Kiana Tucker CSA    Estimated Blood Loss: minimal    Specimens:                ID Type Source Tests Collected by Time   A : right axillary tissue Tissue Axilla, Right TISSUE PATHOLOGY EXAM Juan Ramon Jefferson MD 5/21/2018 1233         Drains:      Findings: Minimal right axillary hidradenitis    Complications: None    Indication: Chronic infection of right axilla    Operative Note:    The patient was seen, marked, and consented preoperatively.  Following this she was brought to the operating room and placed in supine position on the OR table.  Gen. anesthetic was administered and the patient was orotracheally intubated without incident.  A preoperative briefing was performed.  The right arm was extended away from her body in the right axilla was then prepped and draped in normal sterile fashion.  A timeout was then performed.    Following timeout the area of chronic drainage in the right axilla was identified.  An elliptical incision was made around this site and carried down through the skin into the subcutaneous fat using electrocautery.  The thin, inflamed skin overlying the abscess cavity was then excised.  This exposed a chronic abscess cavity with granulation tissue.  All the granulation tissue was also excised.  There did not appear to be any subcutaneous tracts or sinuses.  The wound was excised down to healthy subcutaneous fat.  Electrocautery was used for hemostasis.  Local anesthetic was injected around the excision site.  Moist gauze was  placed into the wound and covered with a sterile dressing.  The patient was then awakened and returned to the recovery room in good condition.          This document has been electronically signed by Juan Ramon Jefferson MD on May 21, 2018 4:22 PM        Juan Ramon Jefferson MD     Date: 5/21/2018  Time: 4:20 PM

## 2018-05-21 NOTE — ANESTHESIA PROCEDURE NOTES
Airway  Urgency: elective    Airway not difficult    General Information and Staff    Patient location during procedure: OR    Indications and Patient Condition  Indications for airway management: airway protection    Preoxygenated: yes  MILS maintained throughout  Mask difficulty assessment: 1 - vent by mask    Final Airway Details  Final airway type: endotracheal airway      Successful airway: ETT  Cuffed: yes   Successful intubation technique: direct laryngoscopy  Facilitating devices/methods: intubating stylet and cricoid pressure  Endotracheal tube insertion site: oral  Blade: Mili  Blade size: #3  ETT size: 7.0 mm  Cormack-Lehane Classification: grade I - full view of glottis  Placement verified by: chest auscultation and capnometry   Measured from: teeth  ETT to teeth (cm): 19  Number of attempts at approach: 1

## 2018-05-21 NOTE — INTERVAL H&P NOTE
H&P reviewed. The patient was examined and there are no changes to the H&P.           This document has been electronically signed by Juan Ramon Jefferson MD on May 21, 2018 12:18 PM

## 2018-05-21 NOTE — BRIEF OP NOTE
AXILLARY HIDRADENITIS EXCISION  Progress Note    Owen Hooper  5/21/2018    Pre-op Diagnosis:   Hidradenitis suppurativa of right axilla [L73.2]       Post-Op Diagnosis Codes:     * Hidradenitis suppurativa of right axilla [L73.2]    Procedure/CPT® Codes:      Procedure(s):  EXCISION OF RIGHT AXILLARY HIDRADENITIS    Surgeon(s):  Juan Ramon Jefferson MD    Anesthesia: General    Staff:   Circulator: Hollie Guerrero RN  Scrub Person: Mariaelena Eason  Assistant: Kiana Tucker CSA    Estimated Blood Loss: minimal    Urine Voided: * No values recorded between 5/21/2018  1:11 PM and 5/21/2018  1:42 PM *    Specimens:                ID Type Source Tests Collected by Time   A : right axillary tissue Tissue Axilla, Right TISSUE PATHOLOGY EXAM Juan Ramon Jefferson MD 5/21/2018 1233         Drains:      Findings: minimal right axillary hidradenitis    Complications: none        This document has been electronically signed by Juan Ramon Jefferson MD on May 21, 2018 1:51 PM          Juan Ramon Jefferson MD     Date: 5/21/2018  Time: 1:50 PM

## 2018-05-21 NOTE — ANESTHESIA PREPROCEDURE EVALUATION
Anesthesia Evaluation     Patient summary reviewed   NPO Solid Status: > 8 hours  NPO Liquid Status: > 8 hours           Airway   Mallampati: II  TM distance: >3 FB  Neck ROM: full  Narrow palate and Possible difficult intubation  Dental    (+) poor dentition    Pulmonary     breath sounds clear to auscultation  (+) a smoker Former, COPD mild,   Cardiovascular   Exercise tolerance: good (4-7 METS)    Rhythm: regular  Rate: normal    (+) hypertension well controlled, hyperlipidemia,       Neuro/Psych  (+) numbness, psychiatric history Anxiety,     GI/Hepatic/Renal/Endo    (+) obesity, morbid obesity, GERD well controlled,      Musculoskeletal     Abdominal   (+) obese,     Abdomen: soft.   Substance History      OB/GYN          Other   (+) arthritis                     Anesthesia Plan    ASA 2     general     intravenous induction   Anesthetic plan and risks discussed with patient.

## 2018-05-21 NOTE — DISCHARGE INSTRUCTIONS
Minor Surgery  Outpatient Instructions    General Information  You have had a minor surgical procedure and are not expected to require extensive treatment or a long recovery period.  However, the following information/instructions that are listed serve as guidelines to help you recover at home.    Activity:  No driving for 24 hours, no driving on narcotic pain medication    Hygiene:  May shower    Dressing/Wound Care:  Remove packing and wash with warm soapy water at least once daily. Change dressing daily    Diet:  Regular diet    Important Points:  -Call your doctor to report any of the following:   -unusual or excessive bleeding/drainage   -pain not reduced/controlled by medication   -elevated temperature consistently greater that 100 degrees F   -increased swelling, redness, bruising, or tenderness in surgical area  -Take medications as prescribed by your physician  -If you have any questions, please contact your doctor or the Same Day Surgery Unit at   [phone]  -If a post-operative emergency occurs, report to your nearest ER

## 2018-05-21 NOTE — ANESTHESIA POSTPROCEDURE EVALUATION
Patient: Owen Hooper    Procedure Summary     Date:  05/21/18 Room / Location:  [facility] OR 06 / [facility] OR    Anesthesia Start:  1311 Anesthesia Stop:  1357    Procedure:  EXCISION OF RIGHT AXILLARY HIDRADENITIS (Right Axilla) Diagnosis:       Hidradenitis suppurativa of right axilla      (Hidradenitis suppurativa of right axilla [L73.2])    Surgeon:  Juan Ramon Jefferson MD Provider:  Jaswinder Riojas MD    Anesthesia Type:  general ASA Status:  2          Anesthesia Type: general  Last vitals  BP   143/84 (05/21/18 1043)   Temp   97.2 °F (36.2 °C) (05/21/18 1043)   Pulse   89 (05/21/18 1043)   Resp   18 (05/21/18 1043)     SpO2   98 % (05/21/18 1043)     Post Anesthesia Care and Evaluation    Patient location during evaluation: PACU  Patient participation: complete - patient participated  Level of consciousness: awake and alert  Pain score: 0  Pain management: adequate  Airway patency: patent  Anesthetic complications: No anesthetic complications  PONV Status: none  Cardiovascular status: hemodynamically stable  Respiratory status: spontaneous ventilation  Hydration status: acceptable

## 2018-05-23 LAB
LAB AP CASE REPORT: NORMAL
Lab: NORMAL
PATH REPORT.FINAL DX SPEC: NORMAL
PATH REPORT.GROSS SPEC: NORMAL

## 2018-05-25 ENCOUNTER — TELEPHONE (OUTPATIENT)
Dept: FAMILY MEDICINE CLINIC | Facility: CLINIC | Age: 43
End: 2018-05-25

## 2018-05-29 ENCOUNTER — OFFICE VISIT (OUTPATIENT)
Dept: SURGERY | Facility: CLINIC | Age: 43
End: 2018-05-29

## 2018-05-29 VITALS
HEIGHT: 67 IN | DIASTOLIC BLOOD PRESSURE: 76 MMHG | SYSTOLIC BLOOD PRESSURE: 126 MMHG | WEIGHT: 252 LBS | BODY MASS INDEX: 39.55 KG/M2

## 2018-05-29 DIAGNOSIS — Z09 FOLLOW UP: Primary | ICD-10-CM

## 2018-05-29 PROCEDURE — 99024 POSTOP FOLLOW-UP VISIT: CPT | Performed by: SURGERY

## 2018-05-29 NOTE — PROGRESS NOTES
CHIEF COMPLAINT:    Chief Complaint   Patient presents with   • Post-op     Excision right axilla hidradentitis on 5/21/18.       HISTORY OF PRESENT ILLNESS:    Owen Hooper is a 42 y.o. female who underwent Excision of right axillary hidradenitis on 5/21/2018.  She returns today for follow-up.  She has been undergoing local wound care at home.  She states that she is washing the area out with antibacterial soap in the shower at least once daily.  She notes continued, resolving pain in the area.    EXAM:  Vitals:    05/29/18 1030   BP: 126/76         Granulating wound in right axilla    ASSESSMENT:    Status post excision of right axillary hidradenitis    PLAN:    Overall she appears to be healing appropriately.  Continue local wound care.  Follow-up in 2 weeks.        This document has been electronically signed by Juan Ramon Jefferson MD on May 29, 2018 10:42 AM

## 2018-06-12 ENCOUNTER — OFFICE VISIT (OUTPATIENT)
Dept: SURGERY | Facility: CLINIC | Age: 43
End: 2018-06-12

## 2018-06-12 VITALS
DIASTOLIC BLOOD PRESSURE: 80 MMHG | BODY MASS INDEX: 40.02 KG/M2 | WEIGHT: 255 LBS | SYSTOLIC BLOOD PRESSURE: 126 MMHG | HEIGHT: 67 IN

## 2018-06-12 DIAGNOSIS — Z09 FOLLOW UP: Primary | ICD-10-CM

## 2018-06-12 PROCEDURE — 99024 POSTOP FOLLOW-UP VISIT: CPT | Performed by: SURGERY

## 2018-06-12 NOTE — PROGRESS NOTES
CHIEF COMPLAINT:    Chief Complaint   Patient presents with   • Follow-up     2 week follow-up. S/P Exc. Rt. axilla hidradenitis 5-21-18.       HISTORY OF PRESENT ILLNESS:    Owen Hooper is a 42 y.o. female who underwent Excision of right axillary hidradenitis on 5/21/2018.  She returns today for follow-up.  She has continued local wound care at home without difficulty.  She does complain of some tenderness in the area.  She occasionally has small amounts of drainage.    EXAM:  Vitals:    06/12/18 1410   BP: 126/80         Granulating right axillary wound, no expressible drainage    ASSESSMENT:    Status post debridement of right axillary hidradenitis    PLAN:    Overall she appears to be healing appropriately.  Continue local wound care.  Follow-up in 2 weeks.        This document has been electronically signed by Juan Ramon Jefferson MD on June 12, 2018 2:27 PM

## 2018-06-26 ENCOUNTER — OFFICE VISIT (OUTPATIENT)
Dept: SURGERY | Facility: CLINIC | Age: 43
End: 2018-06-26

## 2018-06-26 VITALS
HEIGHT: 67 IN | WEIGHT: 253 LBS | BODY MASS INDEX: 39.71 KG/M2 | SYSTOLIC BLOOD PRESSURE: 122 MMHG | DIASTOLIC BLOOD PRESSURE: 82 MMHG

## 2018-06-26 DIAGNOSIS — Z09 FOLLOW UP: Primary | ICD-10-CM

## 2018-06-26 PROCEDURE — 99024 POSTOP FOLLOW-UP VISIT: CPT | Performed by: SURGERY

## 2018-06-26 NOTE — PROGRESS NOTES
CHIEF COMPLAINT:    Chief Complaint   Patient presents with   • Follow-up     S/P Debridement right axillary hidradenitis on 5/21/18.       HISTORY OF PRESENT ILLNESS:    Owen Hooper is a 42 y.o. female who underwent Debridement of right axillary hidradenitis previously.  She returns today for follow-up.  She's been undergoing local wound care at home still.  She has no complaint today.  She reports no drainage from the site.    EXAM:  Vitals:    06/26/18 1508   BP: 122/82         Wound nearly closed with small amount of remaining granulation tissue, no fluctuance, no drainage    ASSESSMENT:    Status post debridement of right axillary hidradenitis    PLAN:    She appears to be nearly completely healed.  She can see me as needed.        This document has been electronically signed by Juan Ramon Jefferson MD on June 26, 2018 3:23 PM

## 2018-06-27 ENCOUNTER — OFFICE VISIT (OUTPATIENT)
Dept: FAMILY MEDICINE CLINIC | Facility: CLINIC | Age: 43
End: 2018-06-27

## 2018-06-27 VITALS
SYSTOLIC BLOOD PRESSURE: 126 MMHG | BODY MASS INDEX: 40.02 KG/M2 | WEIGHT: 255 LBS | HEIGHT: 67 IN | DIASTOLIC BLOOD PRESSURE: 86 MMHG

## 2018-06-27 DIAGNOSIS — F41.1 GENERALIZED ANXIETY DISORDER: Primary | ICD-10-CM

## 2018-06-27 DIAGNOSIS — M54.31 SCIATICA OF RIGHT SIDE: ICD-10-CM

## 2018-06-27 PROCEDURE — 99213 OFFICE O/P EST LOW 20 MIN: CPT | Performed by: FAMILY MEDICINE

## 2018-06-27 RX ORDER — HYDROXYZINE PAMOATE 50 MG/1
50 CAPSULE ORAL NIGHTLY PRN
Qty: 90 CAPSULE | Refills: 1 | Status: SHIPPED | OUTPATIENT
Start: 2018-06-27 | End: 2018-09-13

## 2018-06-27 RX ORDER — SERTRALINE HYDROCHLORIDE 100 MG/1
100 TABLET, FILM COATED ORAL DAILY
Qty: 30 TABLET | Refills: 2 | Status: SHIPPED | OUTPATIENT
Start: 2018-06-27 | End: 2018-12-06

## 2018-06-27 RX ORDER — GABAPENTIN 400 MG/1
400 CAPSULE ORAL 3 TIMES DAILY
Qty: 90 CAPSULE | Refills: 2 | Status: SHIPPED | OUTPATIENT
Start: 2018-06-27 | End: 2018-12-06 | Stop reason: DRUGHIGH

## 2018-06-27 NOTE — PROGRESS NOTES
Subjective   Owen Hooper is a 42 y.o. female.     Anxiety   Presents for follow-up visit. Symptoms include excessive worry, insomnia, irritability, nervous/anxious behavior and restlessness. Patient reports no chest pain, compulsions, confusion, decreased concentration, depressed mood, dizziness, dry mouth, feeling of choking, hyperventilation, impotence, malaise, muscle tension, nausea, obsessions, palpitations, panic, shortness of breath or suicidal ideas. Symptoms occur most days. The severity of symptoms is moderate and causing significant distress. The quality of sleep is fair. Nighttime awakenings: occasional.     Compliance with medications is %.   She had a puppy and that has been helping with her stress and depression. She is concerned that she is going to have to get rid of her puppy.  She had stopped taking her visteril but now she thinks that she needs to restart taking it.  She hasn't been sleeping well.     Back Pain   This is a chronic problem. The current episode started more than 1 year ago. The problem occurs daily. The problem has been gradually worsening since onset. The pain is present in the lumbar spine and sacro-iliac. The quality of the pain is described as aching and shooting. The pain radiates to the right thigh. The pain is at a severity of 6/10. The pain is moderate. The symptoms are aggravated by standing and sitting. Associated symptoms include leg pain and tingling. Pertinent negatives include no abdominal pain, bladder incontinence, bowel incontinence, chest pain, dysuria, fever, headaches, numbness, paresis, paresthesias, pelvic pain, perianal numbness, weakness or weight loss. Risk factors include sedentary lifestyle, obesity and lack of exercise. Treatments tried: She has been taking gabapentin. The treatment provided mild relief.       Current Outpatient Prescriptions:   •  gabapentin (NEURONTIN) 400 MG capsule, Take 1 capsule by mouth 3 (Three) Times a Day., Disp:  "90 capsule, Rfl: 2  •  HYDROcodone-acetaminophen (NORCO) 5-325 MG per tablet, Take 1-2 tablets by mouth Every 4 (Four) Hours As Needed (Pain)., Disp: 30 tablet, Rfl: 0  •  hydrOXYzine (VISTARIL) 50 MG capsule, Take 1 capsule by mouth At Night As Needed for Anxiety., Disp: 90 capsule, Rfl: 1  •  sertraline (ZOLOFT) 100 MG tablet, Take 1 tablet by mouth Daily., Disp: 30 tablet, Rfl: 2    The following portions of the patient's history were reviewed and updated as appropriate: allergies, current medications, past family history, past medical history, past social history, past surgical history and problem list.    Review of Systems   Constitutional: Positive for fatigue and irritability. Negative for activity change, appetite change and unexpected weight change.   Eyes: Negative for visual disturbance.   Respiratory: Negative for cough, shortness of breath and wheezing.    Cardiovascular: Negative for chest pain, palpitations and leg swelling.   Gastrointestinal: Negative for abdominal distention, abdominal pain, constipation, diarrhea, nausea and vomiting.   Genitourinary: Negative for impotence.   Musculoskeletal: Positive for arthralgias and back pain. Negative for gait problem and joint swelling.   Skin: Negative for pallor, rash and wound.   Neurological: Negative for dizziness and headaches.   Psychiatric/Behavioral: Negative for confusion, decreased concentration, dysphoric mood, sleep disturbance and suicidal ideas. The patient is nervous/anxious and has insomnia.        Objective    Vitals:    06/27/18 1459   BP: 126/86   Weight: 116 kg (255 lb)   Height: 170.2 cm (67\")       Physical Exam   Constitutional: She is oriented to person, place, and time. She appears well-developed and well-nourished. No distress.   Cardiovascular: Normal rate, regular rhythm and normal heart sounds.    No murmur heard.  No LE edema.   Pulmonary/Chest: Effort normal and breath sounds normal. No respiratory distress.   Abdominal: " Soft. Bowel sounds are normal. She exhibits no distension. There is no tenderness.   Musculoskeletal:   + straight leg test on the right side   Neurological: She is alert and oriented to person, place, and time.   Psychiatric: Her behavior is normal. Judgment and thought content normal.   Flat affect and appears fatigued   Nursing note and vitals reviewed.      Assessment/Plan   Problems Addressed this Visit        Nervous and Auditory    Sciatica       Other    Generalized anxiety disorder - Primary    Relevant Medications    hydrOXYzine (VISTARIL) 50 MG capsule    sertraline (ZOLOFT) 100 MG tablet        1.) LO-  Will restart her visteril.  Continue sertraline.  Wrote letter to see if she would be able to keep her dog.  Has helped with her anxiety.  2.) Sciatica-  Will continue current medications.  Refilled gabapentin.  UDS today. The patient has read and signed the [facility] Controlled Substance Contract.  I will continue to see patient for regular follow up appointments.  They are well controlled on their medication.  ALIYAH has been reviewed by me and is updated every 3 months. The patient is aware of the potential for addiction and dependence.  RTC in 3 months or sooner PRN

## 2018-12-06 ENCOUNTER — LAB (OUTPATIENT)
Dept: LAB | Facility: HOSPITAL | Age: 43
End: 2018-12-06

## 2018-12-06 ENCOUNTER — OFFICE VISIT (OUTPATIENT)
Dept: FAMILY MEDICINE CLINIC | Facility: CLINIC | Age: 43
End: 2018-12-06

## 2018-12-06 VITALS
BODY MASS INDEX: 41.47 KG/M2 | SYSTOLIC BLOOD PRESSURE: 122 MMHG | WEIGHT: 248.9 LBS | DIASTOLIC BLOOD PRESSURE: 88 MMHG | HEIGHT: 65 IN

## 2018-12-06 DIAGNOSIS — E78.2 MIXED HYPERLIPIDEMIA: ICD-10-CM

## 2018-12-06 DIAGNOSIS — M17.12 PRIMARY OSTEOARTHRITIS OF LEFT KNEE: ICD-10-CM

## 2018-12-06 DIAGNOSIS — Z79.899 HIGH RISK MEDICATION USE: ICD-10-CM

## 2018-12-06 DIAGNOSIS — I10 ESSENTIAL HYPERTENSION: Primary | ICD-10-CM

## 2018-12-06 DIAGNOSIS — Z13.29 SCREENING FOR HYPOTHYROIDISM: ICD-10-CM

## 2018-12-06 DIAGNOSIS — Z12.39 SCREENING FOR BREAST CANCER: ICD-10-CM

## 2018-12-06 DIAGNOSIS — F41.1 GENERALIZED ANXIETY DISORDER: ICD-10-CM

## 2018-12-06 DIAGNOSIS — Z76.89 ENCOUNTER TO ESTABLISH CARE: ICD-10-CM

## 2018-12-06 DIAGNOSIS — G43.901 MIGRAINE WITH STATUS MIGRAINOSUS, NOT INTRACTABLE, UNSPECIFIED MIGRAINE TYPE: ICD-10-CM

## 2018-12-06 LAB
AMPHET+METHAMPHET UR QL: NEGATIVE
BARBITURATES UR QL SCN: NEGATIVE
BENZODIAZ UR QL SCN: NEGATIVE
CANNABINOIDS SERPL QL: NEGATIVE
COCAINE UR QL: NEGATIVE
METHADONE UR QL SCN: NEGATIVE
OPIATES UR QL: NEGATIVE
OXYCODONE UR QL SCN: NEGATIVE

## 2018-12-06 PROCEDURE — 80307 DRUG TEST PRSMV CHEM ANLYZR: CPT

## 2018-12-06 PROCEDURE — 99214 OFFICE O/P EST MOD 30 MIN: CPT | Performed by: NURSE PRACTITIONER

## 2018-12-06 RX ORDER — GABAPENTIN 600 MG/1
600 TABLET ORAL 3 TIMES DAILY
Qty: 90 TABLET | Refills: 2 | Status: SHIPPED | OUTPATIENT
Start: 2018-12-06 | End: 2019-03-06 | Stop reason: SDUPTHER

## 2018-12-06 RX ORDER — SUMATRIPTAN 25 MG/1
25 TABLET, FILM COATED ORAL ONCE AS NEEDED
Qty: 20 TABLET | Refills: 0 | OUTPATIENT
Start: 2018-12-06 | End: 2020-02-23

## 2018-12-06 NOTE — PROGRESS NOTES
"Subjective   Owen Hooper is a 43 y.o. female.   Establish primary care.  Has history of migraines in over the past several months has been having them more frequently.  \"I've been having at least 2 migraines a month now.\"  Needs refills on her medications.    Hypertension   This is a chronic problem. The current episode started more than 1 year ago. The problem is unchanged. The problem is controlled. Associated symptoms include anxiety and blurred vision. Pertinent negatives include no chest pain, orthopnea, palpitations or shortness of breath. There are no associated agents to hypertension. Risk factors for coronary artery disease include obesity, post-menopausal state and sedentary lifestyle. Past treatments include nothing. Current antihypertension treatment includes lifestyle changes. The current treatment provides moderate improvement. Compliance problems include exercise and diet.    Hyperlipidemia   This is a chronic problem. The current episode started more than 1 year ago. The problem is controlled. Exacerbating diseases include obesity. Factors aggravating her hyperlipidemia include fatty foods. Pertinent negatives include no chest pain or shortness of breath. Current antihyperlipidemic treatment includes diet change. The current treatment provides moderate improvement of lipids. Compliance problems include adherence to exercise.    Anxiety   Presents for initial visit. Onset was 1 to 5 years ago. The problem has been gradually worsening. Symptoms include excessive worry, nausea, nervous/anxious behavior and restlessness. Patient reports no chest pain, confusion, palpitations or shortness of breath. Symptoms occur most days. The severity of symptoms is moderate. The quality of sleep is fair. Nighttime awakenings: one to two.       Arthritis   Presents for initial visit. The disease course has been worsening. She complains of pain and stiffness. Associated symptoms include pain while resting. " Pertinent negatives include no fatigue. Her past medical history is significant for osteoarthritis. Her family medical history includes family history of osteoarthritis. Treatments tried: Neurontin. The treatment provided significant relief. Factors aggravating her arthritis include climbing stairs and descending stairs. Compliance with prior treatments has been good.   Migraine    This is a chronic problem. The current episode started more than 1 year ago. The problem has been waxing and waning. The pain is located in the occipital region. The pain does not radiate. The quality of the pain is described as throbbing. The pain is moderate. Associated symptoms include blurred vision, nausea, phonophobia and photophobia. Pertinent negatives include no vomiting. The symptoms are aggravated by noise and bright light. She has tried NSAIDs and acetaminophen for the symptoms. The treatment provided no relief. Her past medical history is significant for obesity.        The following portions of the patient's history were reviewed and updated as appropriate:     She  has a past medical history of Carpal tunnel syndrome, Major depressive disorder, and Tobacco use disorder, severe, dependence.  She does not have any pertinent problems on file.  She  has a past surgical history that includes Carpal tunnel release (right); Trigger finger release (Right); Hysterectomy;  section; Tonsillectomy; Endometrial ablation; Laparoscopic tubal ligation; Other surgical history; and EXCISION OF RIGHT AXILLARY HIDRADENITIS (Right, 2018).  Her family history is not on file.  She  reports that she has been smoking cigarettes.  She has been smoking about 0.50 packs per day. she has never used smokeless tobacco. She reports that she drinks alcohol. She reports that she does not use drugs.  Current Outpatient Medications   Medication Sig Dispense Refill   • hydrOXYzine (VISTARIL) 50 MG capsule Take 50 mg by mouth 3 (Three) Times a Day  As Needed for Itching.     • gabapentin (NEURONTIN) 600 MG tablet Take 1 tablet by mouth 3 (Three) Times a Day. 90 tablet 2   • SUMAtriptan (IMITREX) 25 MG tablet Take 1 tablet by mouth 1 (One) Time As Needed for Migraine for up to 1 dose. 20 tablet 0     No current facility-administered medications for this visit.      Current Outpatient Medications on File Prior to Visit   Medication Sig   • hydrOXYzine (VISTARIL) 50 MG capsule Take 50 mg by mouth 3 (Three) Times a Day As Needed for Itching.   • [DISCONTINUED] gabapentin (NEURONTIN) 400 MG capsule Take 1 capsule by mouth 3 (Three) Times a Day.   • [DISCONTINUED] ciprofloxacin-dexamethasone (CIPRODEX) 0.3-0.1 % otic suspension Administer 4 drops to the right ear 2 (Two) Times a Day.   • [DISCONTINUED] sertraline (ZOLOFT) 100 MG tablet Take 1 tablet by mouth Daily.     No current facility-administered medications on file prior to visit.      She has No Known Allergies..    Review of Systems   Constitutional: Negative.  Negative for chills, diaphoresis and fatigue.   HENT: Negative.    Eyes: Positive for blurred vision and photophobia.   Respiratory: Negative.  Negative for shortness of breath.    Cardiovascular: Negative for chest pain, palpitations and orthopnea.   Gastrointestinal: Positive for nausea. Negative for vomiting.   Genitourinary: Negative.    Musculoskeletal: Positive for arthritis and stiffness.   Skin: Negative.    Neurological: Negative.    Psychiatric/Behavioral: Negative for confusion. The patient is nervous/anxious.        Objective   Physical Exam   Constitutional: She is oriented to person, place, and time. She appears well-developed and well-nourished.   HENT:   Head: Normocephalic.   Right Ear: External ear normal.   Left Ear: External ear normal.   Eyes: EOM are normal. Pupils are equal, round, and reactive to light.   Neck: Normal range of motion. Neck supple.   Cardiovascular: Normal rate, regular rhythm and normal heart sounds.    Pulmonary/Chest: Effort normal and breath sounds normal.   Abdominal: Soft. Bowel sounds are normal.   Musculoskeletal: Normal range of motion.   Neurological: She is alert and oriented to person, place, and time.   Skin: Skin is warm. Capillary refill takes less than 2 seconds.   Psychiatric: She has a normal mood and affect. Her behavior is normal.   Nursing note and vitals reviewed.      Assessment/Plan   Problems Addressed this Visit        Cardiovascular and Mediastinum    Essential hypertension - Primary    Relevant Orders    CBC & Differential    Comprehensive Metabolic Panel    Mixed hyperlipidemia    Relevant Orders    Lipid panel       Musculoskeletal and Integument    Primary osteoarthritis of left knee    Relevant Medications    gabapentin (NEURONTIN) 600 MG tablet       Other    Generalized anxiety disorder      Other Visit Diagnoses     Migraine with status migrainosus, not intractable, unspecified migraine type        Relevant Medications    gabapentin (NEURONTIN) 600 MG tablet    SUMAtriptan (IMITREX) 25 MG tablet    Screening for hypothyroidism        Relevant Orders    TSH    Screening for breast cancer        Relevant Orders    Mammo Screening Digital Tomosynthesis Bilateral With CAD    High risk medication use        Relevant Orders    Urine Drug Screen - Urine, Clean Catch    Encounter to establish care            .  Hypertension:  Complete CBC and chemistry panel as ordered and will notify of results when available  Encouraged to reduce sodium intake to no more than 2000 mg per day    2.  Mixed hyperlipidemia:  Complete fasting lipid panel as ordered and will notify of results when available  Encouraged to adhere to low-fat diet    3.  Primary osteoarthritis of left knee:  Continue on Neurontin as previously prescribed and refill prescription provided to patient  Educated on possible side effects of this medication including but not limited to increased risk for sedation and dependency    4.   Generalized anxiety disorder:  Continue on Vistaril as previously prescribed a refill prescription sent to pharmacy  Encouraged to seek emergency medical treatment for any new or worsening signs or symptoms of anxiety/depression such as increasing sadness, suicidal/homicidal ideations    5.  Migraine with status migrainosus, not intractable, unspecified migraine type:  Begin Imitrex as prescribed be taken 1 by mouth at onset of headache and may repeat dose in 2 hours if no relief of symptoms  Encouraged to lie in a dark, quiet environment during times migraine    6.  Screening for hypothyroidism:  Complete TSH as ordered and will notify of results when available    7.  Screening for breast cancer:  Radiology will call to schedule bilateral mammogram will notify of results when available  Encouraged monthly self breast exams at home    8.  High risk medication use:  ALIYAH reviewed by me and will be scanned into medical record  Controlled substance contract signed and dated and will be scanned into medical record  Complete urine drug screen as ordered     9.  Encounter to establish care:  Schedule follow-up appointment with this office in 3 months or sooner as needed  Continue on current medications as previously prescribed         This document has been electronically signed by JAKUB Pinedo on December 6, 2018 2:32 PM

## 2018-12-07 ENCOUNTER — LAB (OUTPATIENT)
Dept: LAB | Facility: HOSPITAL | Age: 43
End: 2018-12-07

## 2018-12-07 ENCOUNTER — TELEPHONE (OUTPATIENT)
Dept: FAMILY MEDICINE CLINIC | Facility: CLINIC | Age: 43
End: 2018-12-07

## 2018-12-07 DIAGNOSIS — E78.2 MIXED HYPERLIPIDEMIA: ICD-10-CM

## 2018-12-07 DIAGNOSIS — Z13.29 SCREENING FOR HYPOTHYROIDISM: ICD-10-CM

## 2018-12-07 DIAGNOSIS — I10 ESSENTIAL HYPERTENSION: ICD-10-CM

## 2018-12-07 LAB
ALBUMIN SERPL-MCNC: 4.6 G/DL (ref 3.4–4.8)
ALBUMIN/GLOB SERPL: 1.3 G/DL (ref 1.1–1.8)
ALP SERPL-CCNC: 95 U/L (ref 38–126)
ALT SERPL W P-5'-P-CCNC: 23 U/L (ref 9–52)
ANION GAP SERPL CALCULATED.3IONS-SCNC: 13 MMOL/L (ref 5–15)
ARTICHOKE IGE QN: 125 MG/DL (ref 1–129)
AST SERPL-CCNC: 34 U/L (ref 14–36)
BASOPHILS # BLD MANUAL: 0.08 10*3/MM3 (ref 0–0.2)
BASOPHILS NFR BLD AUTO: 1 % (ref 0–2)
BILIRUB SERPL-MCNC: 0.5 MG/DL (ref 0.2–1.3)
BUN BLD-MCNC: 8 MG/DL (ref 7–21)
BUN/CREAT SERPL: 9.1 (ref 7–25)
CALCIUM SPEC-SCNC: 9.3 MG/DL (ref 8.4–10.2)
CHLORIDE SERPL-SCNC: 101 MMOL/L (ref 95–110)
CHOLEST SERPL-MCNC: 225 MG/DL (ref 0–199)
CO2 SERPL-SCNC: 25 MMOL/L (ref 22–31)
CREAT BLD-MCNC: 0.88 MG/DL (ref 0.5–1)
DEPRECATED RDW RBC AUTO: 45.7 FL (ref 36.4–46.3)
EOSINOPHIL # BLD MANUAL: 0.3 10*3/MM3 (ref 0–0.7)
EOSINOPHIL NFR BLD MANUAL: 4 % (ref 0–7)
ERYTHROCYTE [DISTWIDTH] IN BLOOD BY AUTOMATED COUNT: 13.8 % (ref 11.5–14.5)
GFR SERPL CREATININE-BSD FRML MDRD: 84 ML/MIN/1.73 (ref 58–135)
GLOBULIN UR ELPH-MCNC: 3.6 GM/DL (ref 2.3–3.5)
GLUCOSE BLD-MCNC: 95 MG/DL (ref 60–100)
HCT VFR BLD AUTO: 44.3 % (ref 35–45)
HDLC SERPL-MCNC: 34 MG/DL (ref 60–200)
HGB BLD-MCNC: 14.6 G/DL (ref 12–15.5)
LDLC/HDLC SERPL: 4.47 {RATIO} (ref 0–3.22)
LYMPHOCYTES # BLD MANUAL: 4.09 10*3/MM3 (ref 0.6–4.2)
LYMPHOCYTES NFR BLD MANUAL: 54 % (ref 10–50)
LYMPHOCYTES NFR BLD MANUAL: 8 % (ref 0–12)
MCH RBC QN AUTO: 29.8 PG (ref 26.5–34)
MCHC RBC AUTO-ENTMCNC: 33 G/DL (ref 31.4–36)
MCV RBC AUTO: 90.4 FL (ref 80–98)
MONOCYTES # BLD AUTO: 0.61 10*3/MM3 (ref 0–0.9)
NEUTROPHILS # BLD AUTO: 2.27 10*3/MM3 (ref 2–8.6)
NEUTROPHILS NFR BLD MANUAL: 30 % (ref 37–80)
PLAT MORPH BLD: NORMAL
PLATELET # BLD AUTO: 365 10*3/MM3 (ref 150–450)
PMV BLD AUTO: 11.4 FL (ref 8–12)
POTASSIUM BLD-SCNC: 4.3 MMOL/L (ref 3.5–5.1)
PROT SERPL-MCNC: 8.2 G/DL (ref 6.3–8.6)
RBC # BLD AUTO: 4.9 10*6/MM3 (ref 3.77–5.16)
RBC MORPH BLD: NORMAL
SODIUM BLD-SCNC: 139 MMOL/L (ref 137–145)
TRIGL SERPL-MCNC: 195 MG/DL (ref 20–199)
TSH SERPL DL<=0.05 MIU/L-ACNC: 2.83 MIU/ML (ref 0.46–4.68)
VARIANT LYMPHS NFR BLD MANUAL: 3 % (ref 0–5)
WBC MORPH BLD: NORMAL
WBC NRBC COR # BLD: 7.58 10*3/MM3 (ref 3.2–9.8)

## 2018-12-07 PROCEDURE — 36415 COLL VENOUS BLD VENIPUNCTURE: CPT | Performed by: NURSE PRACTITIONER

## 2018-12-07 PROCEDURE — 85007 BL SMEAR W/DIFF WBC COUNT: CPT

## 2018-12-07 PROCEDURE — 84443 ASSAY THYROID STIM HORMONE: CPT

## 2018-12-07 PROCEDURE — 85025 COMPLETE CBC W/AUTO DIFF WBC: CPT

## 2018-12-07 PROCEDURE — 80053 COMPREHEN METABOLIC PANEL: CPT | Performed by: NURSE PRACTITIONER

## 2018-12-07 PROCEDURE — 80061 LIPID PANEL: CPT

## 2018-12-07 RX ORDER — HYDROXYZINE PAMOATE 50 MG/1
50 CAPSULE ORAL 3 TIMES DAILY PRN
Qty: 60 CAPSULE | Refills: 1 | Status: SHIPPED | OUTPATIENT
Start: 2018-12-07 | End: 2019-03-06 | Stop reason: SDUPTHER

## 2018-12-07 NOTE — TELEPHONE ENCOUNTER
Per JAKUB Kothari, Ms. Hooper has been called with her recent lab results & recommendations.  Continue her current medications and follow-up as planned or sooner if any problems.      ----- Message from JAKUB Pinedo sent at 12/7/2018  2:13 PM CST -----  Please call and let her know that her lab work came back and while her cholesterol is still slightly elevated at 225 it has improved.  Also her triglycerides are down from 273 to 195.  She needs to continue on a low-fat diet and increase her exercise regimen and I believe her total cholesterol will come back down to normal limits.  We will recheck her lipid panel at her next visit.  She needs to be fasting.  Thank you.

## 2018-12-07 NOTE — PROGRESS NOTES
Per JAKUB Kothari, Ms. Hooper has been called with her recent lab results & recommendations.  Continue her current medications and follow-up as planned or sooner if any problems.

## 2019-03-06 ENCOUNTER — OFFICE VISIT (OUTPATIENT)
Dept: FAMILY MEDICINE CLINIC | Facility: CLINIC | Age: 44
End: 2019-03-06

## 2019-03-06 VITALS
DIASTOLIC BLOOD PRESSURE: 80 MMHG | BODY MASS INDEX: 37.25 KG/M2 | SYSTOLIC BLOOD PRESSURE: 130 MMHG | WEIGHT: 237.3 LBS | HEIGHT: 67 IN

## 2019-03-06 DIAGNOSIS — Z79.899 HIGH RISK MEDICATION USE: ICD-10-CM

## 2019-03-06 DIAGNOSIS — I10 ESSENTIAL HYPERTENSION: ICD-10-CM

## 2019-03-06 DIAGNOSIS — E78.2 MIXED HYPERLIPIDEMIA: ICD-10-CM

## 2019-03-06 DIAGNOSIS — F41.1 GENERALIZED ANXIETY DISORDER: Primary | ICD-10-CM

## 2019-03-06 DIAGNOSIS — M17.12 PRIMARY OSTEOARTHRITIS OF LEFT KNEE: ICD-10-CM

## 2019-03-06 DIAGNOSIS — Z12.39 SCREENING FOR BREAST CANCER: ICD-10-CM

## 2019-03-06 PROCEDURE — 99214 OFFICE O/P EST MOD 30 MIN: CPT | Performed by: NURSE PRACTITIONER

## 2019-03-06 RX ORDER — HYDROXYZINE HYDROCHLORIDE 10 MG/1
10 TABLET, FILM COATED ORAL 3 TIMES DAILY PRN
Status: CANCELLED | OUTPATIENT
Start: 2019-03-06

## 2019-03-06 RX ORDER — GABAPENTIN 600 MG/1
600 TABLET ORAL 3 TIMES DAILY
Qty: 90 TABLET | Refills: 2 | Status: SHIPPED | OUTPATIENT
Start: 2019-03-06 | End: 2019-06-25 | Stop reason: SDUPTHER

## 2019-03-06 RX ORDER — BUSPIRONE HYDROCHLORIDE 10 MG/1
10 TABLET ORAL 3 TIMES DAILY
Qty: 90 TABLET | Refills: 1 | Status: SHIPPED | OUTPATIENT
Start: 2019-03-06 | End: 2019-06-18 | Stop reason: SDUPTHER

## 2019-03-06 RX ORDER — HYDROXYZINE PAMOATE 50 MG/1
50 CAPSULE ORAL 3 TIMES DAILY PRN
Qty: 60 CAPSULE | Refills: 1 | Status: SHIPPED | OUTPATIENT
Start: 2019-03-06 | End: 2019-03-19 | Stop reason: SDUPTHER

## 2019-03-06 NOTE — PROGRESS NOTES
"Subjective   Owen Hooper is a 43 y.o. female.  Three month follow-up for HTN, hyperlipidemia and arthritis.  Has anxiety and has been on Vistaril in the past.  Went to become her last refill and was told she no longer had any refills at the pharmacy.  Has been out of the Vistaril for several months now.  Anxiety has been getting increasingly worse.\"I am not sure what happened but my last prescription by Dr. Land I thought had refills.\"    Hypertension   This is a chronic problem. The current episode started more than 1 year ago. The problem is unchanged. The problem is controlled. Associated symptoms include anxiety. Pertinent negatives include no chest pain, orthopnea, palpitations or shortness of breath. There are no associated agents to hypertension. Risk factors for coronary artery disease include obesity, post-menopausal state and sedentary lifestyle. Past treatments include nothing. Current antihypertension treatment includes lifestyle changes. The current treatment provides moderate improvement. Compliance problems include exercise and diet.    Hyperlipidemia   This is a chronic problem. The current episode started more than 1 year ago. The problem is controlled. Factors aggravating her hyperlipidemia include fatty foods. Pertinent negatives include no chest pain or shortness of breath. Current antihyperlipidemic treatment includes diet change. The current treatment provides moderate improvement of lipids. Compliance problems include adherence to exercise.    Anxiety   Presents for initial visit. Onset was 1 to 5 years ago. The problem has been gradually worsening. Symptoms include excessive worry, nervous/anxious behavior and restlessness. Patient reports no chest pain, confusion, palpitations or shortness of breath. Symptoms occur most days. The severity of symptoms is moderate. The quality of sleep is poor. Nighttime awakenings: one to two.       Arthritis   Presents for initial visit. The " disease course has been worsening. She complains of pain and stiffness. Associated symptoms include pain while resting. Pertinent negatives include no fatigue. Her past medical history is significant for osteoarthritis. Her family medical history includes family history of osteoarthritis. Treatments tried: Neurontin. The treatment provided significant relief. Factors aggravating her arthritis include climbing stairs and descending stairs. Compliance with prior treatments has been good.        The following portions of the patient's history were reviewed and updated as appropriate:     Current Outpatient Medications   Medication Sig Dispense Refill   • gabapentin (NEURONTIN) 600 MG tablet Take 1 tablet by mouth 3 (Three) Times a Day. 90 tablet 2   • hydrOXYzine pamoate (VISTARIL) 50 MG capsule Take 1 capsule by mouth 3 (Three) Times a Day As Needed for Itching. 60 capsule 1   • SUMAtriptan (IMITREX) 25 MG tablet Take 1 tablet by mouth 1 (One) Time As Needed for Migraine for up to 1 dose. 20 tablet 0   • busPIRone (BUSPAR) 10 MG tablet Take 1 tablet by mouth 3 (Three) Times a Day. 90 tablet 1     No current facility-administered medications for this visit.      Current Outpatient Medications on File Prior to Visit   Medication Sig   • SUMAtriptan (IMITREX) 25 MG tablet Take 1 tablet by mouth 1 (One) Time As Needed for Migraine for up to 1 dose.   • [DISCONTINUED] gabapentin (NEURONTIN) 600 MG tablet Take 1 tablet by mouth 3 (Three) Times a Day.   • [DISCONTINUED] diclofenac (VOLTAREN) 50 MG EC tablet Take 1 tablet by mouth 3 (Three) Times a Day.   • [DISCONTINUED] hydrOXYzine (VISTARIL) 50 MG capsule Take 1 capsule by mouth 3 (Three) Times a Day As Needed for Itching.   • [DISCONTINUED] methocarbamol (ROBAXIN) 750 MG tablet Take 1 tablet by mouth 4 (Four) Times a Day As Needed for Muscle Spasms.   • [DISCONTINUED] MethylPREDNISolone (MEDROL, NEREYDA,) 4 MG tablet Dose pack. Take as directed on package instructions.     No  "current facility-administered medications on file prior to visit.      She has No Known Allergies..    Review of Systems   Constitutional: Negative.  Negative for fatigue.   HENT: Negative.    Eyes: Negative.    Respiratory: Negative.  Negative for shortness of breath.    Cardiovascular: Negative.  Negative for chest pain, palpitations and orthopnea.   Gastrointestinal: Negative.    Genitourinary: Negative.    Musculoskeletal: Positive for arthralgias, arthritis and stiffness.   Skin: Negative.    Neurological: Negative.    Psychiatric/Behavioral: Negative for confusion. The patient is nervous/anxious.        Objective    Visit Vitals  /80   Ht 170.2 cm (67\")   Wt 108 kg (237 lb 4.8 oz)   LMP  (LMP Unknown)   BMI 37.17 kg/m²       Physical Exam   Constitutional: She is oriented to person, place, and time. She appears well-developed and well-nourished.   HENT:   Head: Normocephalic.   Right Ear: External ear normal.   Left Ear: External ear normal.   Eyes: EOM are normal. Pupils are equal, round, and reactive to light.   Neck: Normal range of motion. Neck supple.   Cardiovascular: Normal rate, regular rhythm and normal heart sounds.   Pulmonary/Chest: Effort normal and breath sounds normal.   Abdominal: Soft. Bowel sounds are normal.   Musculoskeletal: Normal range of motion.   Neurological: She is alert and oriented to person, place, and time.   Skin: Skin is warm. Capillary refill takes less than 2 seconds.   Psychiatric: She has a normal mood and affect. Her behavior is normal.   Nursing note and vitals reviewed.      Assessment/Plan   Problems Addressed this Visit        Cardiovascular and Mediastinum    Essential hypertension    Mixed hyperlipidemia       Musculoskeletal and Integument    Primary osteoarthritis of left knee    Relevant Medications    gabapentin (NEURONTIN) 600 MG tablet       Other    Generalized anxiety disorder - Primary    Relevant Medications    hydrOXYzine pamoate (VISTARIL) 50 MG " capsule    busPIRone (BUSPAR) 10 MG tablet      Other Visit Diagnoses     High risk medication use        Relevant Orders    Urine Drug Screen - Urine, Clean Catch    Screening for breast cancer        Relevant Orders    Mammo Screening Digital Tomosynthesis Bilateral With CAD        1.  Hypertension:  Encouraged to reduce sodium intake to no more than 2000 mg per day      2.  Mixed hyperlipidemia:  Complete fasting lipid panel as ordered and will notify of results when available  Encouraged to adhere to low-fat diet     3.  Primary osteoarthritis of left knee:  Continue on Neurontin as previously prescribed and refill prescription provided to patient  Educated on possible side effects of this medication including but not limited to increased risk for sedation and dependency     4.  Generalized anxiety disorder:  We will change Vistaril to 50 mg p.o. nightly  Begin buspirone 10 mg p.o. as prescribed be taken 1 p.o. 3 times daily  Educated on possible side of this medication including but not limited possible suicidal ideations  Encouraged to discontinue medication immediately if suicidal ideations occur and seek emergency medical treatment     5.  High risk medication use:  ALIYAH reviewed by me and will be scanned into medical record  Controlled substance contract signed and dated and will be scanned into medical record  Complete urine drug screen as ordered      Continue on current medications as previously prescribed   Return in about 4 weeks (around 4/3/2019) for Recheck.        This document has been electronically signed by JAKUB Pinedo on March 6, 2019 10:06 AM

## 2019-03-13 ENCOUNTER — LAB (OUTPATIENT)
Dept: LAB | Facility: HOSPITAL | Age: 44
End: 2019-03-13

## 2019-03-13 ENCOUNTER — OFFICE VISIT (OUTPATIENT)
Dept: FAMILY MEDICINE CLINIC | Facility: CLINIC | Age: 44
End: 2019-03-13

## 2019-03-13 VITALS
DIASTOLIC BLOOD PRESSURE: 76 MMHG | BODY MASS INDEX: 36.98 KG/M2 | SYSTOLIC BLOOD PRESSURE: 128 MMHG | HEIGHT: 67 IN | WEIGHT: 235.6 LBS

## 2019-03-13 DIAGNOSIS — R55 NEAR SYNCOPE: Primary | ICD-10-CM

## 2019-03-13 DIAGNOSIS — R55 NEAR SYNCOPE: ICD-10-CM

## 2019-03-13 LAB
25(OH)D3 SERPL-MCNC: 18.8 NG/ML (ref 30–100)
HBA1C MFR BLD: 5.5 % (ref 4–5.6)
VIT B12 BLD-MCNC: 364 PG/ML (ref 239–931)

## 2019-03-13 PROCEDURE — 99213 OFFICE O/P EST LOW 20 MIN: CPT | Performed by: NURSE PRACTITIONER

## 2019-03-13 PROCEDURE — 83036 HEMOGLOBIN GLYCOSYLATED A1C: CPT

## 2019-03-13 PROCEDURE — 82607 VITAMIN B-12: CPT

## 2019-03-13 PROCEDURE — 82306 VITAMIN D 25 HYDROXY: CPT

## 2019-03-13 NOTE — PROGRESS NOTES
"Subjective   Owen Hooper is a 43 y.o. female.  This past Friday was driving to work when she began feeling \"flushed and my hand and arms started feeling tingling.  I got real dizzy.\"  When she got to work her BP was elevated and they sent to her to [facility] for further evaluation.  Had a negative CT scan at that time.  All symptoms have resolved at this time.    Dizziness   This is a new problem. The current episode started 1 to 4 weeks ago. The problem occurs intermittently. The problem has been resolved. Associated symptoms include fatigue, a visual change and weakness. Pertinent negatives include no chills, diaphoresis, fever or headaches. Nothing aggravates the symptoms. Treatments tried: Treated at ER. The treatment provided significant relief.        The following portions of the patient's history were reviewed and updated as appropriate:   Current Outpatient Medications   Medication Sig Dispense Refill   • busPIRone (BUSPAR) 10 MG tablet Take 1 tablet by mouth 3 (Three) Times a Day. 90 tablet 1   • gabapentin (NEURONTIN) 600 MG tablet Take 1 tablet by mouth 3 (Three) Times a Day. 90 tablet 2   • hydrOXYzine pamoate (VISTARIL) 50 MG capsule Take 1 capsule by mouth 3 (Three) Times a Day As Needed for Itching. 60 capsule 1   • SUMAtriptan (IMITREX) 25 MG tablet Take 1 tablet by mouth 1 (One) Time As Needed for Migraine for up to 1 dose. 20 tablet 0     No current facility-administered medications for this visit.      Current Outpatient Medications on File Prior to Visit   Medication Sig   • busPIRone (BUSPAR) 10 MG tablet Take 1 tablet by mouth 3 (Three) Times a Day.   • gabapentin (NEURONTIN) 600 MG tablet Take 1 tablet by mouth 3 (Three) Times a Day.   • hydrOXYzine pamoate (VISTARIL) 50 MG capsule Take 1 capsule by mouth 3 (Three) Times a Day As Needed for Itching.   • SUMAtriptan (IMITREX) 25 MG tablet Take 1 tablet by mouth 1 (One) Time As Needed for Migraine for up to 1 dose.     No current " "facility-administered medications on file prior to visit.      She has No Known Allergies..    Review of Systems   Constitutional: Positive for fatigue. Negative for chills, diaphoresis and fever.   Eyes: Negative.    Respiratory: Negative for chest tightness and shortness of breath.    Cardiovascular: Negative.    Gastrointestinal: Negative.    Genitourinary: Negative.    Musculoskeletal: Negative.    Skin: Negative.    Neurological: Positive for dizziness and weakness. Negative for headaches.   Psychiatric/Behavioral: Negative.  Negative for confusion.       Objective    Visit Vitals  /76   Ht 170.2 cm (67\")   Wt 107 kg (235 lb 9.6 oz)   LMP  (LMP Unknown)   BMI 36.90 kg/m²       Physical Exam   Constitutional: She is oriented to person, place, and time. She appears well-developed and well-nourished.   HENT:   Head: Normocephalic.   Right Ear: External ear normal.   Left Ear: External ear normal.   Eyes: EOM are normal. Pupils are equal, round, and reactive to light.   Neck: Normal range of motion. Neck supple.   Cardiovascular: Normal rate, regular rhythm and normal heart sounds.   Pulmonary/Chest: Effort normal and breath sounds normal.   Abdominal: Soft. Bowel sounds are normal.   Musculoskeletal: Normal range of motion.   Neurological: She is alert and oriented to person, place, and time.   Skin: Skin is warm. Capillary refill takes less than 2 seconds.   Psychiatric: She has a normal mood and affect. Her behavior is normal.   Nursing note and vitals reviewed.      Assessment/Plan   Problems Addressed this Visit     None      Visit Diagnoses     Near syncope    -  Primary    Relevant Orders    Hemoglobin A1c    Vitamin D 25 hydroxy    Vitamin B12        1.  Near-syncope:  Complete hemoglobin A1c, vitamin D and vitamin B12 level as ordered and will notify of results when available  Will obtain copy of medical records from [facility] pertaining to her ER visit  Encouraged increased fluids to help " promote hydration  Encouraged to eat complex carbohydrates as this could be related to hypoglycemic events  Encouraged to seek emergency medical treatment for any new or worsening dizziness, chest pain, shortness of breath or near syncopal episode  Continue on current medications as previously prescribed   Return if symptoms worsen or fail to improve, for Next scheduled follow up.        This document has been electronically signed by JAKUB Pinedo on March 13, 2019 9:55 AM

## 2019-03-14 DIAGNOSIS — E55.9 VITAMIN D DEFICIENCY: Primary | ICD-10-CM

## 2019-03-14 RX ORDER — ERGOCALCIFEROL 1.25 MG/1
50000 CAPSULE ORAL WEEKLY
Qty: 12 CAPSULE | Refills: 3 | Status: SHIPPED | OUTPATIENT
Start: 2019-03-14 | End: 2020-03-13

## 2019-03-18 NOTE — PROGRESS NOTES
No Answer, Phone number is no longer accepting calls.  Letter sent to the patient with lab results & recommendations.  My direct line number of [phone] was included in the letter as well.  She was also instructed to continue current medications and follow-up as planned or sooner if any problems.

## 2019-03-19 RX ORDER — HYDROXYZINE PAMOATE 50 MG/1
50 CAPSULE ORAL 3 TIMES DAILY PRN
Qty: 60 CAPSULE | Refills: 1 | Status: SHIPPED | OUTPATIENT
Start: 2019-03-19 | End: 2019-06-25 | Stop reason: SDUPTHER

## 2019-03-19 NOTE — TELEPHONE ENCOUNTER
Ms. Hooper called and states [facility] told her they never got the script sent on 03/06/19.    Script resent today

## 2019-04-24 ENCOUNTER — OFFICE VISIT (OUTPATIENT)
Dept: FAMILY MEDICINE CLINIC | Facility: CLINIC | Age: 44
End: 2019-04-24

## 2019-04-24 VITALS
DIASTOLIC BLOOD PRESSURE: 92 MMHG | WEIGHT: 223.6 LBS | BODY MASS INDEX: 35.09 KG/M2 | HEIGHT: 67 IN | SYSTOLIC BLOOD PRESSURE: 122 MMHG

## 2019-04-24 DIAGNOSIS — N30.01 ACUTE CYSTITIS WITH HEMATURIA: Primary | ICD-10-CM

## 2019-04-24 DIAGNOSIS — R55 NEAR SYNCOPE: ICD-10-CM

## 2019-04-24 LAB
BILIRUB BLD-MCNC: NEGATIVE MG/DL
CLARITY, POC: ABNORMAL
COLOR UR: ABNORMAL
GLUCOSE UR STRIP-MCNC: NEGATIVE MG/DL
KETONES UR QL: NEGATIVE
LEUKOCYTE EST, POC: ABNORMAL
NITRITE UR-MCNC: POSITIVE MG/ML
PH UR: 5 [PH] (ref 5–8)
PROT UR STRIP-MCNC: ABNORMAL MG/DL
RBC # UR STRIP: ABNORMAL /UL
SP GR UR: 1.02 (ref 1–1.03)
UROBILINOGEN UR QL: ABNORMAL

## 2019-04-24 PROCEDURE — 99213 OFFICE O/P EST LOW 20 MIN: CPT | Performed by: NURSE PRACTITIONER

## 2019-04-24 RX ORDER — PHENAZOPYRIDINE HYDROCHLORIDE 200 MG/1
200 TABLET, FILM COATED ORAL 3 TIMES DAILY PRN
Qty: 20 TABLET | Refills: 0 | Status: SHIPPED | OUTPATIENT
Start: 2019-04-24 | End: 2019-06-25

## 2019-04-24 RX ORDER — CIPROFLOXACIN 500 MG/1
500 TABLET, FILM COATED ORAL 2 TIMES DAILY
Qty: 14 TABLET | Refills: 0 | Status: SHIPPED | OUTPATIENT
Start: 2019-04-24 | End: 2019-06-25

## 2019-04-24 RX ORDER — HYDROXYZINE 50 MG/1
TABLET, FILM COATED ORAL
Refills: 1 | COMMUNITY
Start: 2019-03-20 | End: 2019-04-24 | Stop reason: SDUPTHER

## 2019-04-24 NOTE — PROGRESS NOTES
Subjective   Owen Hooper is a 43 y.o. female.   One month follow-up for near syncope. Has not had any other symptoms since her last visit.  Three days ago began complaining of burning with urination, lower abdominal pain and blood in her urine.      Dizziness   This is a new problem. The current episode started more than 1 month ago. The problem occurs intermittently. The problem has been resolved. Pertinent negatives include no chills, diaphoresis, fatigue, fever, headaches, visual change or weakness. Treatments tried: Treated at ER.   Urinary Tract Infection    This is a new problem. The current episode started in the past 7 days. The problem occurs every urination. The problem has been rapidly worsening. The quality of the pain is described as burning. The pain is at a severity of 9/10. The pain is severe. There has been no fever. She is sexually active. Associated symptoms include flank pain, frequency, hematuria, hesitancy and urgency. Pertinent negatives include no chills. She has tried increased fluids for the symptoms. The treatment provided no relief.        The following portions of the patient's history were reviewed and updated as appropriate:     Current Outpatient Medications   Medication Sig Dispense Refill   • busPIRone (BUSPAR) 10 MG tablet Take 1 tablet by mouth 3 (Three) Times a Day. 90 tablet 1   • ergocalciferol (ERGOCALCIFEROL) 18959 units capsule Take 1 capsule by mouth 1 (One) Time Per Week. 12 capsule 3   • gabapentin (NEURONTIN) 600 MG tablet Take 1 tablet by mouth 3 (Three) Times a Day. 90 tablet 2   • hydrOXYzine pamoate (VISTARIL) 50 MG capsule Take 1 capsule by mouth 3 (Three) Times a Day As Needed for Itching. 60 capsule 1   • SUMAtriptan (IMITREX) 25 MG tablet Take 1 tablet by mouth 1 (One) Time As Needed for Migraine for up to 1 dose. 20 tablet 0   • ciprofloxacin (CIPRO) 500 MG tablet Take 1 tablet by mouth 2 (Two) Times a Day. 14 tablet 0   • phenazopyridine (PYRIDIUM) 200  "MG tablet Take 1 tablet by mouth 3 (Three) Times a Day As Needed for bladder spasms. 20 tablet 0     No current facility-administered medications for this visit.      Current Outpatient Medications on File Prior to Visit   Medication Sig   • busPIRone (BUSPAR) 10 MG tablet Take 1 tablet by mouth 3 (Three) Times a Day.   • ergocalciferol (ERGOCALCIFEROL) 99926 units capsule Take 1 capsule by mouth 1 (One) Time Per Week.   • gabapentin (NEURONTIN) 600 MG tablet Take 1 tablet by mouth 3 (Three) Times a Day.   • hydrOXYzine pamoate (VISTARIL) 50 MG capsule Take 1 capsule by mouth 3 (Three) Times a Day As Needed for Itching.   • SUMAtriptan (IMITREX) 25 MG tablet Take 1 tablet by mouth 1 (One) Time As Needed for Migraine for up to 1 dose.   • [DISCONTINUED] hydrOXYzine (ATARAX) 50 MG tablet TK 1 T PO  BID PRF ITCHING     No current facility-administered medications on file prior to visit.      She has No Known Allergies..    Review of Systems   Constitutional: Negative.  Negative for chills, diaphoresis, fatigue and fever.   Respiratory: Negative.    Cardiovascular: Negative.    Gastrointestinal: Negative.    Genitourinary: Positive for flank pain, frequency, hematuria, hesitancy and urgency.   Musculoskeletal: Negative for back pain.   Skin: Negative.    Neurological: Positive for dizziness. Negative for weakness and headaches.       Objective    Visit Vitals  /92   Ht 170.2 cm (67\")   Wt 101 kg (223 lb 9.6 oz)   LMP  (LMP Unknown)   BMI 35.02 kg/m²       Physical Exam   Constitutional: She is oriented to person, place, and time. She appears well-developed and well-nourished. She has a sickly appearance.   Appears in pain    HENT:   Head: Normocephalic.   Cardiovascular: Normal rate, regular rhythm and normal heart sounds.   Pulmonary/Chest: Effort normal and breath sounds normal.   Abdominal: Soft. Bowel sounds are normal. There is tenderness in the suprapubic area.   Musculoskeletal: Normal range of motion. "   Neurological: She is alert and oriented to person, place, and time.   Skin: Skin is warm. Capillary refill takes less than 2 seconds.   Psychiatric: She has a normal mood and affect. Her behavior is normal.   Nursing note and vitals reviewed.      Assessment/Plan   Problems Addressed this Visit     None      Visit Diagnoses     Acute cystitis with hematuria    -  Primary    Relevant Medications    ciprofloxacin (CIPRO) 500 MG tablet    phenazopyridine (PYRIDIUM) 200 MG tablet    Other Relevant Orders    POCT urinalysis dipstick, manual (Completed)    Near syncope            1.  Near syncope:  Symptoms have resolved   Encouraged to continue to increase water intake     2.  Acute cystitis with hematuria:   Urinalysis dipstick performed in office  Begin Cipro as prescribed   Educated on possible side effect so this medication including but not limited to increase risk for nausea, QT prolongation and diarrhea   Begin Pyridium as prescribed to be taken 1 p.o. every 8 hours as needed for pain  Educated on possible side effects of this medication including but not limited to increased risk for urine discoloration, pruritus  Encouraged to increase water intake to help promote hydration and elimination  Encouraged to seek emergency medical treatment for any new or worsening flank pain, suprapubic pain or fever as this may be related to pyelonephritis    Continue on current medications as previously prescribed   Return in about 2 months (around 6/24/2019) for Recheck.        This document has been electronically signed by JAKUB Pinedo on April 24, 2019 12:34 PM

## 2019-06-18 DIAGNOSIS — F41.1 GENERALIZED ANXIETY DISORDER: ICD-10-CM

## 2019-06-18 RX ORDER — HYDROXYZINE 50 MG/1
TABLET, FILM COATED ORAL
Qty: 60 TABLET | Refills: 0 | Status: SHIPPED | OUTPATIENT
Start: 2019-06-18 | End: 2019-08-12

## 2019-06-18 RX ORDER — BUSPIRONE HYDROCHLORIDE 10 MG/1
TABLET ORAL
Qty: 90 TABLET | Refills: 0 | OUTPATIENT
Start: 2019-06-18 | End: 2020-02-23

## 2019-06-22 DIAGNOSIS — M17.12 PRIMARY OSTEOARTHRITIS OF LEFT KNEE: ICD-10-CM

## 2019-06-24 RX ORDER — GABAPENTIN 600 MG/1
TABLET ORAL
Qty: 90 TABLET | Refills: 0 | OUTPATIENT
Start: 2019-06-24

## 2019-06-25 ENCOUNTER — OFFICE VISIT (OUTPATIENT)
Dept: FAMILY MEDICINE CLINIC | Facility: CLINIC | Age: 44
End: 2019-06-25

## 2019-06-25 VITALS
WEIGHT: 233 LBS | DIASTOLIC BLOOD PRESSURE: 86 MMHG | BODY MASS INDEX: 36.57 KG/M2 | SYSTOLIC BLOOD PRESSURE: 130 MMHG | HEIGHT: 67 IN

## 2019-06-25 DIAGNOSIS — Z79.899 HIGH RISK MEDICATION USE: Primary | ICD-10-CM

## 2019-06-25 DIAGNOSIS — F41.1 GENERALIZED ANXIETY DISORDER: ICD-10-CM

## 2019-06-25 DIAGNOSIS — E78.2 MIXED HYPERLIPIDEMIA: ICD-10-CM

## 2019-06-25 DIAGNOSIS — I10 ESSENTIAL HYPERTENSION: ICD-10-CM

## 2019-06-25 DIAGNOSIS — M17.12 PRIMARY OSTEOARTHRITIS OF LEFT KNEE: ICD-10-CM

## 2019-06-25 PROCEDURE — 99214 OFFICE O/P EST MOD 30 MIN: CPT | Performed by: NURSE PRACTITIONER

## 2019-06-25 RX ORDER — GABAPENTIN 600 MG/1
600 TABLET ORAL 3 TIMES DAILY
Qty: 90 TABLET | Refills: 2 | Status: SHIPPED | OUTPATIENT
Start: 2019-06-25 | End: 2019-09-22 | Stop reason: SDUPTHER

## 2019-06-25 NOTE — PROGRESS NOTES
Subjective   Owen Hooper is a 43 y.o. female.  Three month follow-up.  Needs refill on her Neurontin     Hypertension   This is a chronic problem. The current episode started more than 1 year ago. The problem is unchanged. The problem is controlled. Associated symptoms include anxiety. Pertinent negatives include no chest pain, orthopnea, palpitations or shortness of breath. There are no associated agents to hypertension. Risk factors for coronary artery disease include obesity, post-menopausal state and sedentary lifestyle. Past treatments include nothing. Current antihypertension treatment includes lifestyle changes. The current treatment provides moderate improvement. Compliance problems include exercise and diet.    Hyperlipidemia   This is a chronic problem. The current episode started more than 1 year ago. The problem is controlled. Factors aggravating her hyperlipidemia include fatty foods. Pertinent negatives include no chest pain or shortness of breath. Current antihyperlipidemic treatment includes diet change. The current treatment provides moderate improvement of lipids. Compliance problems include adherence to exercise.    Anxiety   Presents for initial visit. Onset was 1 to 5 years ago. The problem has been gradually worsening. Symptoms include excessive worry, nervous/anxious behavior and restlessness. Patient reports no chest pain, confusion, palpitations or shortness of breath. Symptoms occur most days. The severity of symptoms is moderate. The quality of sleep is poor. Nighttime awakenings: one to two.       Arthritis   Presents for initial visit. The disease course has been worsening. She complains of pain and stiffness. Associated symptoms include pain while resting. Pertinent negatives include no fatigue or fever. Her past medical history is significant for osteoarthritis. Her family medical history includes family history of osteoarthritis. Treatments tried: Neurontin. The treatment  provided significant relief. Factors aggravating her arthritis include climbing stairs and descending stairs. Compliance with prior treatments has been good.        The following portions of the patient's history were reviewed and updated as appropriate:     Current Outpatient Medications   Medication Sig Dispense Refill   • busPIRone (BUSPAR) 10 MG tablet TAKE 1 TABLET BY MOUTH THREE TIMES DAILY 90 tablet 0   • ergocalciferol (ERGOCALCIFEROL) 72699 units capsule Take 1 capsule by mouth 1 (One) Time Per Week. 12 capsule 3   • gabapentin (NEURONTIN) 600 MG tablet Take 1 tablet by mouth 3 (Three) Times a Day. 90 tablet 2   • hydrOXYzine (ATARAX) 50 MG tablet TAKE 1 TABLET BY MOUTH THREE TIMES DAILY AS NEEDED FOR ITCHING 60 tablet 0   • SUMAtriptan (IMITREX) 25 MG tablet Take 1 tablet by mouth 1 (One) Time As Needed for Migraine for up to 1 dose. 20 tablet 0     No current facility-administered medications for this visit.      Current Outpatient Medications on File Prior to Visit   Medication Sig   • busPIRone (BUSPAR) 10 MG tablet TAKE 1 TABLET BY MOUTH THREE TIMES DAILY   • ergocalciferol (ERGOCALCIFEROL) 06047 units capsule Take 1 capsule by mouth 1 (One) Time Per Week.   • hydrOXYzine (ATARAX) 50 MG tablet TAKE 1 TABLET BY MOUTH THREE TIMES DAILY AS NEEDED FOR ITCHING   • SUMAtriptan (IMITREX) 25 MG tablet Take 1 tablet by mouth 1 (One) Time As Needed for Migraine for up to 1 dose.   • [DISCONTINUED] gabapentin (NEURONTIN) 600 MG tablet Take 1 tablet by mouth 3 (Three) Times a Day.   • [DISCONTINUED] ciprofloxacin (CIPRO) 500 MG tablet Take 1 tablet by mouth 2 (Two) Times a Day.   • [DISCONTINUED] hydrOXYzine pamoate (VISTARIL) 50 MG capsule Take 1 capsule by mouth 3 (Three) Times a Day As Needed for Itching.   • [DISCONTINUED] phenazopyridine (PYRIDIUM) 200 MG tablet Take 1 tablet by mouth 3 (Three) Times a Day As Needed for bladder spasms.     No current facility-administered medications on file prior to visit.   "    She has No Known Allergies..    Review of Systems   Constitutional: Negative.  Negative for chills, diaphoresis, fatigue and fever.   HENT: Negative.    Eyes: Negative.    Respiratory: Negative.  Negative for shortness of breath.    Cardiovascular: Negative.  Negative for chest pain, palpitations and orthopnea.   Gastrointestinal: Negative.    Genitourinary: Negative.    Musculoskeletal: Positive for arthralgias, arthritis and stiffness.   Skin: Negative.    Neurological: Negative.    Psychiatric/Behavioral: Negative for confusion. The patient is nervous/anxious.        Objective    Visit Vitals  /86   Ht 170.2 cm (67\")   Wt 106 kg (233 lb)   LMP  (LMP Unknown)   BMI 36.49 kg/m²       Physical Exam   Constitutional: She is oriented to person, place, and time. She appears well-developed and well-nourished.   HENT:   Head: Normocephalic.   Right Ear: External ear normal.   Left Ear: External ear normal.   Eyes: EOM are normal. Pupils are equal, round, and reactive to light.   Neck: Normal range of motion. Neck supple.   Cardiovascular: Normal rate, regular rhythm and normal heart sounds.   Pulmonary/Chest: Effort normal and breath sounds normal.   Abdominal: Soft. Bowel sounds are normal.   Musculoskeletal: Normal range of motion.   Neurological: She is alert and oriented to person, place, and time.   Skin: Skin is warm. Capillary refill takes less than 2 seconds.   Psychiatric: She has a normal mood and affect. Her behavior is normal.   Nursing note and vitals reviewed.      Assessment/Plan   Problems Addressed this Visit        Cardiovascular and Mediastinum    Essential hypertension    Mixed hyperlipidemia       Musculoskeletal and Integument    Primary osteoarthritis of left knee    Relevant Medications    gabapentin (NEURONTIN) 600 MG tablet       Other    Generalized anxiety disorder      Other Visit Diagnoses     High risk medication use    -  Primary    Relevant Orders    Urine Drug Screen - Urine, " Clean Catch        New Medications Ordered This Visit   Medications   • gabapentin (NEURONTIN) 600 MG tablet     Sig: Take 1 tablet by mouth 3 (Three) Times a Day.     Dispense:  90 tablet     Refill:  2     1.  Hypertension:  Encouraged to reduce sodium intake to no more than 2000 mg per day  Encouraged to increase physical activity exercises such as walking or hiking to promote cardiovascular health      2.  Mixed hyperlipidemia:  Encouraged to adhere to low-fat diet  Encouraged healthy food choices such as baked chicken and fish as opposed to fried, greasy food     3.  Primary osteoarthritis of left knee:  Continue on Neurontin as previously prescribed and refill prescription provided to patient  Educated on possible side effects of this medication including but not limited to increased risk for sedation and dependency     4.  Generalized anxiety disorder:   Continue on Vistaril and buspirone as previously prescribed  Educated on possible side of this medication including but not limited possible suicidal ideations  Encouraged to discontinue medication immediately if suicidal ideations occur and seek emergency medical treatment     5.  High risk medication use:  ALIYAH reviewed by me and will be scanned into medical record  Complete urine drug screen as ordered     Continue on current medications as previously prescribed   Return in about 3 months (around 9/25/2019) for Recheck.        This document has been electronically signed by JAKUB Pinedo on June 25, 2019 12:59 PM

## 2019-06-30 ENCOUNTER — APPOINTMENT (OUTPATIENT)
Dept: GENERAL RADIOLOGY | Facility: HOSPITAL | Age: 44
End: 2019-06-30

## 2019-06-30 ENCOUNTER — HOSPITAL ENCOUNTER (EMERGENCY)
Facility: HOSPITAL | Age: 44
Discharge: HOME | End: 2019-06-30
Attending: EMERGENCY MEDICINE | Admitting: EMERGENCY MEDICINE

## 2019-06-30 VITALS
HEART RATE: 72 BPM | OXYGEN SATURATION: 100 % | TEMPERATURE: 98.1 F | DIASTOLIC BLOOD PRESSURE: 93 MMHG | RESPIRATION RATE: 20 BRPM | WEIGHT: 229.1 LBS | HEIGHT: 67 IN | SYSTOLIC BLOOD PRESSURE: 150 MMHG | BODY MASS INDEX: 35.96 KG/M2

## 2019-06-30 DIAGNOSIS — L02.412 ABSCESS OF AXILLA, LEFT: ICD-10-CM

## 2019-06-30 DIAGNOSIS — G89.29 CHRONIC PAIN OF LEFT KNEE: Primary | ICD-10-CM

## 2019-06-30 DIAGNOSIS — M25.562 CHRONIC PAIN OF LEFT KNEE: Primary | ICD-10-CM

## 2019-06-30 PROCEDURE — 73564 X-RAY EXAM KNEE 4 OR MORE: CPT

## 2019-06-30 PROCEDURE — 99283 EMERGENCY DEPT VISIT LOW MDM: CPT

## 2019-06-30 RX ORDER — MELOXICAM 15 MG/1
15 TABLET ORAL DAILY PRN
Qty: 15 TABLET | Refills: 0 | Status: SHIPPED | OUTPATIENT
Start: 2019-06-30 | End: 2019-09-06

## 2019-06-30 RX ORDER — SULFAMETHOXAZOLE AND TRIMETHOPRIM 800; 160 MG/1; MG/1
1 TABLET ORAL 2 TIMES DAILY
Qty: 14 TABLET | Refills: 0 | Status: SHIPPED | OUTPATIENT
Start: 2019-06-30 | End: 2019-07-07

## 2019-07-11 ENCOUNTER — TELEPHONE (OUTPATIENT)
Dept: FAMILY MEDICINE CLINIC | Facility: CLINIC | Age: 44
End: 2019-07-11

## 2019-07-11 ENCOUNTER — OFFICE VISIT (OUTPATIENT)
Dept: SURGERY | Facility: CLINIC | Age: 44
End: 2019-07-11

## 2019-07-11 VITALS
BODY MASS INDEX: 35.82 KG/M2 | SYSTOLIC BLOOD PRESSURE: 132 MMHG | DIASTOLIC BLOOD PRESSURE: 84 MMHG | WEIGHT: 228.2 LBS | HEIGHT: 67 IN | HEART RATE: 78 BPM | TEMPERATURE: 97 F

## 2019-07-11 DIAGNOSIS — L98.9 DISORDER OF SKIN: Primary | ICD-10-CM

## 2019-07-11 PROCEDURE — 99213 OFFICE O/P EST LOW 20 MIN: CPT | Performed by: SURGERY

## 2019-07-11 NOTE — PROGRESS NOTES
CHIEF COMPLAINT:    Chief Complaint   Patient presents with   • Abscess     Possible left Axilla abscess.       HISTORY OF PRESENT ILLNESS:    Owen Hooper is a 43 y.o. female who has been previously seen and operated on for hidradenitis of the right axilla.  She returns today with complaints of recent swelling and drainage from the left axilla.  She states that over the past week or 2 she developed significant swelling and pain followed by drainage from the site in the left axilla.  She was treated with Bactrim and clindamycin.  The area has now resolved.  She notes no pain in the axilla, no further drainage, no fevers or chills.    EXAM:  Vitals:    07/11/19 0853   BP: 132/84   Pulse: 78   Temp: 97 °F (36.1 °C)         Well-healed right axillary excision site.  Left axilla with small open area consistent with history of recent draining abscess.  No fluctuance, no erythema, nontender    ASSESSMENT:    Spontaneously drained left axillary abscess which appears to be healing    PLAN:    I advised the patient to return if she has recurrence of symptoms.          This document has been electronically signed by Juan Ramon Jefferson MD on July 11, 2019 9:04 AM

## 2019-07-12 NOTE — TELEPHONE ENCOUNTER
Can you please call and let her know that unfortunately she signed in the pain contract that it will not be replaced even if it gets stolen.

## 2019-07-12 NOTE — TELEPHONE ENCOUNTER
Left message for patient to call me back.    Ele said we could refer her to pain clinic if she would like but we can not refill the Gabapentin

## 2019-07-15 NOTE — TELEPHONE ENCOUNTER
Ele,    Ms Hooper states she understands and really was not wanting a refill, just to let you know just incase something happened with the script having her name on it.

## 2019-08-12 ENCOUNTER — TELEPHONE (OUTPATIENT)
Dept: FAMILY MEDICINE CLINIC | Facility: CLINIC | Age: 44
End: 2019-08-12

## 2019-08-12 ENCOUNTER — OFFICE VISIT (OUTPATIENT)
Dept: FAMILY MEDICINE CLINIC | Facility: CLINIC | Age: 44
End: 2019-08-12

## 2019-08-12 VITALS
WEIGHT: 221.8 LBS | SYSTOLIC BLOOD PRESSURE: 138 MMHG | HEART RATE: 108 BPM | DIASTOLIC BLOOD PRESSURE: 92 MMHG | BODY MASS INDEX: 34.81 KG/M2 | HEIGHT: 67 IN

## 2019-08-12 DIAGNOSIS — F41.9 ANXIETY: ICD-10-CM

## 2019-08-12 DIAGNOSIS — I10 ESSENTIAL HYPERTENSION: Primary | ICD-10-CM

## 2019-08-12 PROCEDURE — 99213 OFFICE O/P EST LOW 20 MIN: CPT | Performed by: NURSE PRACTITIONER

## 2019-08-12 RX ORDER — TRAZODONE HYDROCHLORIDE 50 MG/1
50 TABLET ORAL NIGHTLY
Qty: 30 TABLET | Refills: 5 | OUTPATIENT
Start: 2019-08-12 | End: 2020-02-23

## 2019-08-12 RX ORDER — PHENTERMINE HYDROCHLORIDE 30 MG/1
CAPSULE ORAL
Refills: 0 | COMMUNITY
Start: 2019-07-23 | End: 2020-06-18

## 2019-08-12 NOTE — TELEPHONE ENCOUNTER
Can you please have them check and see if she can see someone on the fifth floor such as Dr. Leigh, Denton Cao or Dr. Kunz

## 2019-08-12 NOTE — PROGRESS NOTES
Subjective   Owen Hooper is a 43 y.o. female.  This past Friday was at work when her anxiety flared up.  Had the employee nurse check her blood pressure at that time and blood pressure was elevated.  They sent her home.  Now they will not let her return to work before having her blood pressure rechecked and cleared by her provider.  Currently on BuSpar, hydroxyzine for her anxiety but is not helping.    Hypertension   This is a chronic problem. The current episode started more than 1 year ago. The problem has been waxing and waning since onset. The problem is controlled. Associated symptoms include anxiety. Pertinent negatives include no orthopnea or palpitations. There are no associated agents to hypertension. Risk factors for coronary artery disease include obesity, post-menopausal state and sedentary lifestyle. Past treatments include nothing. Current antihypertension treatment includes lifestyle changes. The current treatment provides moderate improvement. Compliance problems include exercise and diet.    Anxiety   Presents for follow-up visit. Symptoms include excessive worry, nervous/anxious behavior and restlessness. Patient reports no confusion or palpitations. Symptoms occur most days. The severity of symptoms is moderate. The quality of sleep is poor. Nighttime awakenings: one to two.            The following portions of the patient's history were reviewed and updated as appropriate:     Current Outpatient Medications   Medication Sig Dispense Refill   • busPIRone (BUSPAR) 10 MG tablet TAKE 1 TABLET BY MOUTH THREE TIMES DAILY 90 tablet 0   • ergocalciferol (ERGOCALCIFEROL) 74156 units capsule Take 1 capsule by mouth 1 (One) Time Per Week. 12 capsule 3   • gabapentin (NEURONTIN) 600 MG tablet Take 1 tablet by mouth 3 (Three) Times a Day. 90 tablet 2   • meloxicam (MOBIC) 15 MG tablet Take 1 tablet by mouth Daily As Needed for Mild Pain . 15 tablet 0   • phentermine 30 MG capsule TK ONE C PO  DAILY   "0   • SUMAtriptan (IMITREX) 25 MG tablet Take 1 tablet by mouth 1 (One) Time As Needed for Migraine for up to 1 dose. 20 tablet 0   • traZODone (DESYREL) 50 MG tablet Take 1 tablet by mouth Every Night. 30 tablet 5     No current facility-administered medications for this visit.      Current Outpatient Medications on File Prior to Visit   Medication Sig   • busPIRone (BUSPAR) 10 MG tablet TAKE 1 TABLET BY MOUTH THREE TIMES DAILY   • ergocalciferol (ERGOCALCIFEROL) 13954 units capsule Take 1 capsule by mouth 1 (One) Time Per Week.   • gabapentin (NEURONTIN) 600 MG tablet Take 1 tablet by mouth 3 (Three) Times a Day.   • meloxicam (MOBIC) 15 MG tablet Take 1 tablet by mouth Daily As Needed for Mild Pain .   • phentermine 30 MG capsule TK ONE C PO  DAILY   • SUMAtriptan (IMITREX) 25 MG tablet Take 1 tablet by mouth 1 (One) Time As Needed for Migraine for up to 1 dose.   • [DISCONTINUED] hydrOXYzine (ATARAX) 50 MG tablet TAKE 1 TABLET BY MOUTH THREE TIMES DAILY AS NEEDED FOR ITCHING     No current facility-administered medications on file prior to visit.      She has No Known Allergies..    Review of Systems   Constitutional: Negative.  Negative for chills and diaphoresis.   Respiratory: Negative.    Cardiovascular: Negative.  Negative for palpitations and orthopnea.   Skin: Negative.    Neurological: Negative.    Psychiatric/Behavioral: Negative for confusion. The patient is nervous/anxious.        Objective    Visit Vitals  /92   Pulse 108   Ht 170.2 cm (67\")   Wt 101 kg (221 lb 12.8 oz)   LMP  (LMP Unknown)   BMI 34.74 kg/m²       Physical Exam   Constitutional: She is oriented to person, place, and time. She appears well-developed and well-nourished.   Cardiovascular: Normal rate, regular rhythm and normal heart sounds.   Pulmonary/Chest: Effort normal and breath sounds normal.   Musculoskeletal: Normal range of motion.   Neurological: She is alert and oriented to person, place, and time.   Skin: Skin is warm. "   Psychiatric: She has a normal mood and affect. Her behavior is normal.   Nursing note and vitals reviewed.      Assessment/Plan   Problems Addressed this Visit        Cardiovascular and Mediastinum    Essential hypertension - Primary      Other Visit Diagnoses     Anxiety        Relevant Medications    traZODone (DESYREL) 50 MG tablet        1.  Essential hypertension:  Reduce sodium intake to no more than 2000 mg/day  Encouraged to increase physical activity such as walking or hiking to promote cardiovascular health    2.  Anxiety:  Discontinue hydroxyzine as previously prescribed  Begin trazodone as prescribed to be taken 1 p.o. nightly  Educated on possible side effects of this medication including but not limited to increased risk for worsening of depression, suicidal/homicidal ideations or somnolence  Encouraged to discontinue medication immediately if suicidal ideations occur and seek emergency medical treatment  Discussed stress relieving techniques such as deep breathing, guided imagery and meditation during anxiety attacks    Continue on current medications as previously prescribed   Return for Recheck, Next scheduled follow up.        This document has been electronically signed by JAKUB Pinedo on August 12, 2019 12:57 PM

## 2019-08-12 NOTE — TELEPHONE ENCOUNTER
Owen said she was at work on Friday and her anxiety flared up and when the nurse there checked her b/p it was 180/200 .  She said she does not have any b/p issues that she is aware of and they will not let her come back to work until she is cleared with her doctor.  She said she needs to go back to work as soon as possible, they work on a point system, but [facility] does not have any openings.  Please advise.

## 2019-09-20 NOTE — TELEPHONE ENCOUNTER
27 yo  Turkmen speaking female who presents for routine gyn visit.  Reports long history of sensitive skin - mayra in the vaginal/vulvar area.  Complains of itching today x 1 week. Reports that she has used diflucan in the pill to improve her symptoms.    Pap from 2016 wnl. Needs pap today.  Reports that she had STD testing with another MD and that all was fine.    Has Mirena IUD in place since Aug 2017. No cycles with IUD in place.     H/o HSV.    ROS:  GENERAL: Denies weight gain or weight loss. Feeling well overall.   SKIN: Denies rash or lesions.   HEAD: Denies head injury or headache.   CHEST: Denies chest pain or shortness of breath.   CARDIOVASCULAR: Denies palpitations or left sided chest pain.   ABDOMEN: No abdominal pain, constipation, diarrhea, nausea, vomiting or rectal bleeding.   URINARY: No frequency, dysuria, hematuria, or burning on urination.  REPRODUCTIVE: See HPI.   BREASTS denies pain, lumps, or nipple discharge.   HEMATOLOGIC: No easy bruisability or excessive bleeding.   MUSCULOSKELETAL: Denies joint pain or swelling.   NEUROLOGIC: Denies syncope or weakness.   PSYCHIATRIC: Denies depression, anxiety or mood swings.         PE:   Vitals: BP 90/72   Wt 75.9 kg (167 lb 5.3 oz)   Breastfeeding? No   BMI 31.10 kg/m²   APPEARANCE: Well nourished, well developed, in no acute distress.  SKIN:skin yeast under breasts noted  CHEST: Lungs clear to auscultation.  HEART: Regular rate and rhythm, no murmurs, rubs or gallops.  ABDOMEN: Soft. No tenderness or masses. No hepatosplenomegaly. No hernias.  BREASTS: Symmetrical, no skin changes or visible lesions. No palpable masses, nipple discharge or adenopathy bilaterally.  PELVIC: Normal external female genitalia without lesions. Normal hair distribution. Adequate perineal body, normal urethral meatus. Vagina moist and well rugated without lesions or discharge. Cervix pink and without lesions. No significant cystocele or rectocele. Bimanual exam  Pt called and said that she had called last week and was told that [facility] was out of office so she called today to let you know that her Gabapentin was stolen out of her car last week. She called the police dept to let them know because she didn't want the bottle with her name on it floating around and her name on it and they said that they would make a note of it that she called to report it. Her phone is [phone].   showed uterus normal size, shape, position, mobile and nontender. Adnexa without masses or tenderness. Urethra and bladder normal. IUD string visualized.    AP  Routine gyn  -s/p normal breast exam:   -s/p normal pelvic exam:   -Pap and HPV: collected  -STD testing: declined  -contraception: Mirena IUD in place  -vaginitis: affirm collected; rx for diflucan provided  -skin yeast; rx for antifungal shampoo provided        MAYE Hopkins MD

## 2019-09-22 DIAGNOSIS — M17.12 PRIMARY OSTEOARTHRITIS OF LEFT KNEE: ICD-10-CM

## 2019-09-23 RX ORDER — GABAPENTIN 600 MG/1
TABLET ORAL
Qty: 90 TABLET | Refills: 0 | OUTPATIENT
Start: 2019-09-23 | End: 2020-02-23

## 2019-09-23 NOTE — TELEPHONE ENCOUNTER
[facility] Vazquez's patient    Ms. Owen Hooper is requesting a refill on her Gabapentin 600 mg #90 Take 1 TID       Last OV   08/12/19      Next Jayda OV    9/25/2019    Last Script Written  06/25/19  #90  With 2 Refills  (I called her and she is out of medication)      Last Cal     07/11/19     Please advise on refill    Thank you

## 2020-04-09 ENCOUNTER — TELEMEDICINE (OUTPATIENT)
Dept: FAMILY MEDICINE CLINIC | Facility: CLINIC | Age: 45
End: 2020-04-09

## 2020-04-09 DIAGNOSIS — F32.A ANXIETY AND DEPRESSION: Primary | ICD-10-CM

## 2020-04-09 DIAGNOSIS — F41.9 ANXIETY AND DEPRESSION: Primary | ICD-10-CM

## 2020-04-09 PROCEDURE — 99213 OFFICE O/P EST LOW 20 MIN: CPT | Performed by: NURSE PRACTITIONER

## 2020-04-09 RX ORDER — HYDROXYZINE 50 MG/1
50 TABLET, FILM COATED ORAL 3 TIMES DAILY PRN
Qty: 90 TABLET | Refills: 2 | Status: SHIPPED | OUTPATIENT
Start: 2020-04-09 | End: 2020-06-18

## 2020-04-09 RX ORDER — CITALOPRAM 20 MG/1
20 TABLET ORAL DAILY
Qty: 30 TABLET | Refills: 5 | Status: SHIPPED | OUTPATIENT
Start: 2020-04-09 | End: 2020-06-18

## 2020-04-09 NOTE — PROGRESS NOTES
Subjective   Owen Hooper is a 44 y.o. female.  Has been treated in the past for anxiety and depression but has been doing well over the last several months.  Coronavirus pandemic coupled with the fact that her daughters boyfriend recently passed in a motor vehicle accident has caused anxiety and depression to come surging back.    Anxiety   Presents for follow-up visit. Onset was 1 to 5 years ago. The problem has been gradually worsening. Symptoms include decreased concentration, depressed mood, excessive worry, nervous/anxious behavior and restlessness. Patient reports no confusion or shortness of breath. Symptoms occur constantly. The severity of symptoms is moderate. The quality of sleep is poor. Nighttime awakenings: one to two.     Her past medical history is significant for depression.   Depression   Visit Type: follow-up  Patient presents with the following symptoms: decreased concentration, depressed mood, excessive worry, nervousness/anxiety and restlessness.  Patient is not experiencing: confusion and shortness of breath.  Frequency of symptoms: constantly   Severity: moderate   Sleep quality: poor  Nighttime awakenings: one to two         The following portions of the patient's history were reviewed and updated as appropriate:     Current Outpatient Medications   Medication Sig Dispense Refill   • citalopram (CeleXA) 20 MG tablet Take 1 tablet by mouth Daily. 30 tablet 5   • fluticasone (FLONASE) 50 MCG/ACT nasal spray 2 sprays into the nostril(s) as directed by provider Daily. 1 bottle 0   • hydrocortisone 1 % cream Apply  topically to the appropriate area as directed 2 (Two) Times a Day. Do not apply longer than 2 weeks 20 g 0   • hydrOXYzine (ATARAX) 50 MG tablet Take 1 tablet by mouth 3 (Three) Times a Day As Needed for Anxiety. 90 tablet 2   • phentermine 30 MG capsule TK ONE C PO  DAILY  0     No current facility-administered medications for this visit.      Current Outpatient Medications on  File Prior to Visit   Medication Sig   • fluticasone (FLONASE) 50 MCG/ACT nasal spray 2 sprays into the nostril(s) as directed by provider Daily.   • hydrocortisone 1 % cream Apply  topically to the appropriate area as directed 2 (Two) Times a Day. Do not apply longer than 2 weeks   • phentermine 30 MG capsule TK ONE C PO  DAILY     No current facility-administered medications on file prior to visit.      She has No Known Allergies..    Review of Systems   Constitutional: Positive for fatigue. Negative for fever.   Respiratory: Negative for cough and shortness of breath.    Musculoskeletal: Negative.    Skin: Negative.    Neurological: Negative.    Psychiatric/Behavioral: Positive for decreased concentration. Negative for confusion. The patient is nervous/anxious.        Objective   Physical Exam   Constitutional: She is oriented to person, place, and time. She appears well-developed and well-nourished.   Neurological: She is alert and oriented to person, place, and time.   Psychiatric: She has a normal mood and affect. Her behavior is normal.       Assessment/Plan   Problems Addressed this Visit     None      Visit Diagnoses     Anxiety and depression    -  Primary    Relevant Medications    citalopram (CeleXA) 20 MG tablet    hydrOXYzine (ATARAX) 50 MG tablet        1.  Anxiety and depression:  Begin Celexa as prescribed  Educated on possible side of this medication including but not limited possible suicidal ideations  Encouraged to discontinue medication immediately if suicidal ideations occur and seek emergency medical treatment  Begin Atarax as prescribed  Educated on possible side effects of this medication including but not limited to increased risk for drowsiness, dizziness and bitter taste  Discussed stress relieving techniques such as deep breathing, meditation and guided imagery    Continue on current medications as previously prescribed   This visit has been rescheduled as a phone visit to comply with  patient safety concerns in accordance with CDC recommendations. Total time of discussion was 19 minutes.  You have chosen to receive care through a telephone visit today. Do you consent to use a telephone visit for your medical care today? Yes  Return in about 4 weeks (around 5/7/2020) for Recheck.        This document has been electronically signed by JAKUB Pinedo on April 9, 2020 13:19

## 2020-05-07 ENCOUNTER — TELEMEDICINE (OUTPATIENT)
Dept: FAMILY MEDICINE CLINIC | Facility: CLINIC | Age: 45
End: 2020-05-07

## 2020-05-07 VITALS — BODY MASS INDEX: 31.86 KG/M2 | WEIGHT: 203 LBS | HEIGHT: 67 IN

## 2020-05-07 DIAGNOSIS — F41.9 ANXIETY AND DEPRESSION: ICD-10-CM

## 2020-05-07 DIAGNOSIS — F32.A ANXIETY AND DEPRESSION: ICD-10-CM

## 2020-05-07 DIAGNOSIS — I10 ESSENTIAL HYPERTENSION: Primary | ICD-10-CM

## 2020-05-07 PROCEDURE — 99213 OFFICE O/P EST LOW 20 MIN: CPT | Performed by: NURSE PRACTITIONER

## 2020-05-07 NOTE — PROGRESS NOTES
"Subjective   Owen Hooper is a 44 y.o. female.  Follow-up for high blood pressure, anxiety and depression.  Has been isolating at home with her mother, daughter and granddaughter.  Has not been able to  her medications due to insurance issues \"but I am doing really good.\"    Hypertension   This is a chronic problem. The current episode started more than 1 year ago. The problem has been waxing and waning since onset. The problem is controlled. Associated symptoms include anxiety. Pertinent negatives include no orthopnea, palpitations or shortness of breath. There are no associated agents to hypertension. Risk factors for coronary artery disease include obesity, post-menopausal state and sedentary lifestyle. Past treatments include nothing. Current antihypertension treatment includes lifestyle changes. The current treatment provides moderate improvement. Compliance problems include exercise and diet.    Anxiety   Presents for follow-up visit. Symptoms include decreased concentration, depressed mood, nervous/anxious behavior and restlessness. Patient reports no confusion, excessive worry, palpitations or shortness of breath. Symptoms occur occasionally. The severity of symptoms is mild. The quality of sleep is good. Nighttime awakenings: one to two, occasional.     Her past medical history is significant for depression.   Depression   Visit Type: follow-up  Patient presents with the following symptoms: decreased concentration, depressed mood, nervousness/anxiety and restlessness.  Patient is not experiencing: confusion, excessive worry, palpitations and shortness of breath.  Frequency of symptoms: occasionally   Severity: mild   Sleep quality: good  Nighttime awakenings: occasional         The following portions of the patient's history were reviewed and updated as appropriate:   Current Outpatient Medications   Medication Sig Dispense Refill   • citalopram (CeleXA) 20 MG tablet Take 1 tablet by mouth " Daily. 30 tablet 5   • hydrOXYzine (ATARAX) 50 MG tablet Take 1 tablet by mouth 3 (Three) Times a Day As Needed for Anxiety. 90 tablet 2   • phentermine 30 MG capsule TK ONE C PO  DAILY  0     No current facility-administered medications for this visit.      Current Outpatient Medications on File Prior to Visit   Medication Sig   • citalopram (CeleXA) 20 MG tablet Take 1 tablet by mouth Daily.   • hydrOXYzine (ATARAX) 50 MG tablet Take 1 tablet by mouth 3 (Three) Times a Day As Needed for Anxiety.   • phentermine 30 MG capsule TK ONE C PO  DAILY   • [DISCONTINUED] fluticasone (FLONASE) 50 MCG/ACT nasal spray 2 sprays into the nostril(s) as directed by provider Daily.   • [DISCONTINUED] hydrocortisone 1 % cream Apply  topically to the appropriate area as directed 2 (Two) Times a Day. Do not apply longer than 2 weeks     No current facility-administered medications on file prior to visit.      She has No Known Allergies..    Review of Systems   Constitutional: Negative.    Respiratory: Negative.  Negative for cough and shortness of breath.    Cardiovascular: Negative.  Negative for palpitations and orthopnea.   Gastrointestinal: Negative.    Genitourinary: Negative.    Musculoskeletal: Negative.    Skin: Negative.    Neurological: Negative.    Psychiatric/Behavioral: Positive for decreased concentration. Negative for confusion. The patient is nervous/anxious.        Objective   Physical Exam   Constitutional: She is oriented to person, place, and time.   Neurological: She is alert and oriented to person, place, and time.       Assessment/Plan   Problems Addressed this Visit        Cardiovascular and Mediastinum    Essential hypertension - Primary       Other    Anxiety and depression        1.  Essential hypertension:  Reduce sodium intake to no more than 2000 mg/day  Discussed ways to reduce sodium in diet such as using a salt substitute like Mrs. Che as opposed to table salt  Encouraged to avoid high sodium content  snacks such as potato chips smoked, canned or cured meats as well as canned soups and salted nuts  Encouraged to increase exercises such as walking or hiking to help remote cardiovascular health     2.  Anxiety and depression:  Continue on Celexa as previously prescribed  Educated on possible side effects of this medication including but not limited to increased risk for worsening of depression, suicidal/homicidal ideations or somnolence  Encouraged to discontinue medication immediately if suicidal ideations occur and seek emergency medical treatment  Discussed stress relieving techniques such as deep breathing, guided imagery and meditation during anxiety attacks    This visit has been rescheduled as a phone visit to comply with patient safety concerns in accordance with CDC recommendations. Total time of discussion was 16 minutes.    Continue on current medications as previously prescribed   Return in about 3 months (around 8/7/2020) for Annual physical.        This document has been electronically signed by JAKUB Pinedo on May 7, 2020 09:17

## 2020-06-18 ENCOUNTER — OFFICE VISIT (OUTPATIENT)
Dept: FAMILY MEDICINE CLINIC | Facility: CLINIC | Age: 45
End: 2020-06-18

## 2020-06-18 VITALS
SYSTOLIC BLOOD PRESSURE: 124 MMHG | BODY MASS INDEX: 33.74 KG/M2 | DIASTOLIC BLOOD PRESSURE: 92 MMHG | WEIGHT: 215 LBS | HEIGHT: 67 IN

## 2020-06-18 DIAGNOSIS — F41.9 ANXIETY AND DEPRESSION: ICD-10-CM

## 2020-06-18 DIAGNOSIS — Z79.899 HIGH RISK MEDICATION USE: ICD-10-CM

## 2020-06-18 DIAGNOSIS — F32.A ANXIETY AND DEPRESSION: ICD-10-CM

## 2020-06-18 DIAGNOSIS — M54.31 SCIATICA OF RIGHT SIDE: Primary | ICD-10-CM

## 2020-06-18 PROCEDURE — 99213 OFFICE O/P EST LOW 20 MIN: CPT | Performed by: NURSE PRACTITIONER

## 2020-06-18 RX ORDER — GABAPENTIN 100 MG/1
100 CAPSULE ORAL 2 TIMES DAILY
Qty: 60 CAPSULE | Refills: 2 | Status: SHIPPED | OUTPATIENT
Start: 2020-06-18 | End: 2020-12-24

## 2020-06-18 RX ORDER — GABAPENTIN 100 MG/1
100 CAPSULE ORAL 2 TIMES DAILY
Qty: 60 CAPSULE | Refills: 2 | Status: SHIPPED | OUTPATIENT
Start: 2020-06-18 | End: 2020-06-18 | Stop reason: SDUPTHER

## 2020-06-18 RX ORDER — GABAPENTIN 100 MG/1
100 CAPSULE ORAL 2 TIMES DAILY
Qty: 60 CAPSULE | Refills: 2 | Status: SHIPPED | OUTPATIENT
Start: 2020-06-18 | End: 2020-06-18

## 2020-06-18 RX ORDER — CITALOPRAM 20 MG/1
20 TABLET ORAL DAILY
Qty: 30 TABLET | Refills: 5 | Status: SHIPPED | OUTPATIENT
Start: 2020-06-18 | End: 2020-12-24

## 2020-06-18 NOTE — PROGRESS NOTES
"Subjective   Owen Hooper is a 44 y.o. female.  Fpur weeks ago began having \"dull, aching pain in my right hip that goes into my leg.  I am having sciatica again.  I took gabapentin in the past and not really helped.\"  1 month ago was evaluated for anxiety and depression and placed on Celexa.  \"My insurance initially would not pay for it.  I finally got it straightened out but now they say the prescription needs to be seen again.\"    Back Pain   This is a recurrent problem. The current episode started more than 1 year ago. The problem occurs 2 to 4 times per day. The problem has been waxing and waning since onset. The pain is present in the lumbar spine. The quality of the pain is described as shooting. The pain radiates to the right thigh. The pain is at a severity of 6/10. The pain is the same all the time. The symptoms are aggravated by sitting, bending and standing. Stiffness is present all day. Associated symptoms include headaches, leg pain and numbness. Pertinent negatives include no abdominal pain, bladder incontinence, bowel incontinence, chest pain, dysuria, fever, paresis, paresthesias, pelvic pain, perianal numbness, tingling, weakness or weight loss. Risk factors include lack of exercise, poor posture and sedentary lifestyle. She has tried home exercises, NSAIDs and heat for the symptoms. The treatment provided no relief.   Anxiety   Presents for follow-up visit. Onset was 1 to 5 years ago. The problem has been gradually worsening. Symptoms include decreased concentration, depressed mood, excessive worry, nervous/anxious behavior and restlessness. Patient reports no chest pain, confusion or shortness of breath. Symptoms occur constantly. The severity of symptoms is moderate. The quality of sleep is poor. Nighttime awakenings: one to two.     Her past medical history is significant for depression.   Depression   Visit Type: follow-up  Patient presents with the following symptoms: decreased " "concentration, depressed mood, excessive worry, nervousness/anxiety and restlessness.  Patient is not experiencing: confusion, shortness of breath and weight loss.  Frequency of symptoms: constantly   Severity: moderate   Sleep quality: poor  Nighttime awakenings: one to two         The following portions of the patient's history were reviewed and updated as appropriate:     Current Outpatient Medications   Medication Sig Dispense Refill   • citalopram (CeleXA) 20 MG tablet Take 1 tablet by mouth Daily. 30 tablet 5   • gabapentin (NEURONTIN) 100 MG capsule Take 1 capsule by mouth 2 (Two) Times a Day. 60 capsule 2     No current facility-administered medications for this visit.      Current Outpatient Medications on File Prior to Visit   Medication Sig   • [DISCONTINUED] citalopram (CeleXA) 20 MG tablet Take 1 tablet by mouth Daily.   • [DISCONTINUED] hydrOXYzine (ATARAX) 50 MG tablet Take 1 tablet by mouth 3 (Three) Times a Day As Needed for Anxiety.   • [DISCONTINUED] phentermine 30 MG capsule TK ONE C PO  DAILY     No current facility-administered medications on file prior to visit.      She has No Known Allergies..    Review of Systems   Constitutional: Negative.  Negative for fever and weight loss.   HENT: Negative for sore throat.    Respiratory: Negative.  Negative for shortness of breath.    Cardiovascular: Negative.  Negative for chest pain.   Gastrointestinal: Negative.  Negative for abdominal pain and bowel incontinence.   Genitourinary: Negative.  Negative for bladder incontinence, dysuria and pelvic pain.   Musculoskeletal: Positive for back pain.   Neurological: Positive for numbness and headaches. Negative for tingling, weakness and paresthesias.   Psychiatric/Behavioral: Positive for decreased concentration. Negative for confusion. The patient is nervous/anxious.        Objective    Visit Vitals  /92   Ht 170.2 cm (67\")   Wt 97.5 kg (215 lb)   LMP  (LMP Unknown)   BMI 33.67 kg/m²       Physical " Exam   Constitutional: She is oriented to person, place, and time. She appears well-developed and well-nourished.   HENT:   Head: Normocephalic.   Right Ear: External ear normal.   Left Ear: External ear normal.   Eyes: Pupils are equal, round, and reactive to light. EOM are normal.   Neck: Normal range of motion. Neck supple.   Cardiovascular: Normal rate, regular rhythm and normal heart sounds.   Pulmonary/Chest: Effort normal and breath sounds normal.   Abdominal: Soft. Bowel sounds are normal.   Musculoskeletal: Normal range of motion.        Lumbar back: She exhibits tenderness and pain.   Noted amount of tenderness to the right sciatic notch with palpation.  Positive straight leg raise test   Neurological: She is alert and oriented to person, place, and time.   Skin: Skin is warm. Capillary refill takes less than 2 seconds.   Psychiatric: She has a normal mood and affect. Her behavior is normal.   Nursing note and vitals reviewed.      Assessment/Plan   Problems Addressed this Visit        Nervous and Auditory    Sciatica - Primary    Relevant Medications    gabapentin (NEURONTIN) 100 MG capsule       Other    Anxiety and depression    Relevant Medications    citalopram (CeleXA) 20 MG tablet      Other Visit Diagnoses     High risk medication use        Relevant Orders    Urine Drug Screen - Urine, Clean Catch        New Medications Ordered This Visit   Medications   • citalopram (CeleXA) 20 MG tablet     Sig: Take 1 tablet by mouth Daily.     Dispense:  30 tablet     Refill:  5   • gabapentin (NEURONTIN) 100 MG capsule     Sig: Take 1 capsule by mouth 2 (Two) Times a Day.     Dispense:  60 capsule     Refill:  2     1.  Sciatica:  Begin Neurontin as prescribed  Educated on possible sedative side effects of this medication encouraged not to take this medication prior to operating motor vehicle or heavy machinery  Encouraged to continue with physical therapy exercises for her back at home  May use lidocaine  with Aspercreme OTC according to package directions    2.  Anxiety and depression:  Begin Celexa as previously prescribed and new prescription sent to pharmacy  Educated on possible side of this medication including but not limited possible suicidal ideations  Encouraged to discontinue medication immediately, if suicidal ideations occur and seek emergency medical treatment  Discussed stress relieving techniques such as deep breathing, meditation  Discussed how going back to work after being furloughed for the last several weeks due to COVID-19 has helped improve her outlook    3.  High risk medication use:  ALIYAH reviewed by me and will be scanned into medical record  Controlled substance contract signed and dated and will be scanned into medical record  Complete urine drug screen as ordered     Continue on current medications as previously prescribed   I spent 24 minutes in direct face to face contact with patient.  Greater than 50% of this time was spent counseling patient and discussing plan of care.  Return in about 3 months (around 9/18/2020) for Annual physical.        This document has been electronically signed by JAKUB Pinedo on June 18, 2020 08:27

## 2020-06-25 ENCOUNTER — TELEPHONE (OUTPATIENT)
Dept: FAMILY MEDICINE CLINIC | Facility: CLINIC | Age: 45
End: 2020-06-25

## 2020-06-25 NOTE — TELEPHONE ENCOUNTER
PT called, states she's at a loss. She's filed for short term disability (RE: depression, see [employer] message from earlier today, 6/25/20) but she has no way to submit the paperwork to [employer]AKUB [employer]. Typically, she would've went to the local library to print off the forms, but the library is still closed. Asked PT if her disability carrier could possibly fax the forms or mail them, states that would take too long. She's going to attempt to screen shot the forms and submit them via [employer]     Please advise/call Owen back: [phone].

## 2020-06-29 ENCOUNTER — TELEPHONE (OUTPATIENT)
Dept: FAMILY MEDICINE CLINIC | Facility: CLINIC | Age: 45
End: 2020-06-29

## 2020-09-12 ENCOUNTER — HOSPITAL ENCOUNTER (EMERGENCY)
Facility: HOSPITAL | Age: 45
Discharge: HOME | End: 2020-09-12
Attending: STUDENT IN AN ORGANIZED HEALTH CARE EDUCATION/TRAINING PROGRAM | Admitting: STUDENT IN AN ORGANIZED HEALTH CARE EDUCATION/TRAINING PROGRAM

## 2020-09-12 ENCOUNTER — APPOINTMENT (OUTPATIENT)
Dept: CT IMAGING | Facility: HOSPITAL | Age: 45
End: 2020-09-12

## 2020-09-12 VITALS
DIASTOLIC BLOOD PRESSURE: 86 MMHG | WEIGHT: 209 LBS | BODY MASS INDEX: 32.8 KG/M2 | TEMPERATURE: 98.7 F | HEIGHT: 67 IN | RESPIRATION RATE: 18 BRPM | HEART RATE: 83 BPM | OXYGEN SATURATION: 98 % | SYSTOLIC BLOOD PRESSURE: 141 MMHG

## 2020-09-12 DIAGNOSIS — R11.0 NAUSEA: ICD-10-CM

## 2020-09-12 DIAGNOSIS — R10.31 RIGHT LOWER QUADRANT ABDOMINAL PAIN: Primary | ICD-10-CM

## 2020-09-12 LAB
ALBUMIN SERPL-MCNC: 4.2 G/DL (ref 3.5–5.2)
ALBUMIN/GLOB SERPL: 1.7 G/DL
ALP SERPL-CCNC: 94 U/L (ref 39–117)
ALT SERPL W P-5'-P-CCNC: 13 U/L (ref 1–33)
ANION GAP SERPL CALCULATED.3IONS-SCNC: 10 MMOL/L (ref 5–15)
AST SERPL-CCNC: 15 U/L (ref 1–32)
BASOPHILS # BLD AUTO: 0.02 10*3/MM3 (ref 0–0.2)
BASOPHILS NFR BLD AUTO: 0.3 % (ref 0–1.5)
BILIRUB SERPL-MCNC: 0.3 MG/DL (ref 0–1.2)
BILIRUB UR QL STRIP: NEGATIVE
BUN SERPL-MCNC: 8 MG/DL (ref 6–20)
BUN/CREAT SERPL: 10.4 (ref 7–25)
CALCIUM SPEC-SCNC: 9 MG/DL (ref 8.6–10.5)
CHLORIDE SERPL-SCNC: 105 MMOL/L (ref 98–107)
CLARITY UR: ABNORMAL
CO2 SERPL-SCNC: 26 MMOL/L (ref 22–29)
COLOR UR: YELLOW
CREAT SERPL-MCNC: 0.77 MG/DL (ref 0.57–1)
DEPRECATED RDW RBC AUTO: 45.5 FL (ref 37–54)
EOSINOPHIL # BLD AUTO: 0.13 10*3/MM3 (ref 0–0.4)
EOSINOPHIL NFR BLD AUTO: 1.9 % (ref 0.3–6.2)
ERYTHROCYTE [DISTWIDTH] IN BLOOD BY AUTOMATED COUNT: 13.7 % (ref 12.3–15.4)
GFR SERPL CREATININE-BSD FRML MDRD: 98 ML/MIN/1.73
GLOBULIN UR ELPH-MCNC: 2.5 GM/DL
GLUCOSE SERPL-MCNC: 112 MG/DL (ref 65–99)
GLUCOSE UR STRIP-MCNC: NEGATIVE MG/DL
HCT VFR BLD AUTO: 40.2 % (ref 34–46.6)
HGB BLD-MCNC: 13.2 G/DL (ref 12–15.9)
HGB UR QL STRIP.AUTO: NEGATIVE
HOLD SPECIMEN: NORMAL
HOLD SPECIMEN: NORMAL
IMM GRANULOCYTES # BLD AUTO: 0.02 10*3/MM3 (ref 0–0.05)
IMM GRANULOCYTES NFR BLD AUTO: 0.3 % (ref 0–0.5)
KETONES UR QL STRIP: NEGATIVE
LEUKOCYTE ESTERASE UR QL STRIP.AUTO: NEGATIVE
LIPASE SERPL-CCNC: 20 U/L (ref 13–60)
LYMPHOCYTES # BLD AUTO: 2.72 10*3/MM3 (ref 0.7–3.1)
LYMPHOCYTES NFR BLD AUTO: 40.2 % (ref 19.6–45.3)
MCH RBC QN AUTO: 29.6 PG (ref 26.6–33)
MCHC RBC AUTO-ENTMCNC: 32.8 G/DL (ref 31.5–35.7)
MCV RBC AUTO: 90.1 FL (ref 79–97)
MONOCYTES # BLD AUTO: 0.5 10*3/MM3 (ref 0.1–0.9)
MONOCYTES NFR BLD AUTO: 7.4 % (ref 5–12)
NEUTROPHILS NFR BLD AUTO: 3.38 10*3/MM3 (ref 1.7–7)
NEUTROPHILS NFR BLD AUTO: 49.9 % (ref 42.7–76)
NITRITE UR QL STRIP: NEGATIVE
NRBC BLD AUTO-RTO: 0 /100 WBC (ref 0–0.2)
PH UR STRIP.AUTO: 8 [PH] (ref 5–9)
PLATELET # BLD AUTO: 320 10*3/MM3 (ref 140–450)
PMV BLD AUTO: 10.8 FL (ref 6–12)
POTASSIUM SERPL-SCNC: 3.6 MMOL/L (ref 3.5–5.2)
PROT SERPL-MCNC: 6.7 G/DL (ref 6–8.5)
PROT UR QL STRIP: NEGATIVE
RBC # BLD AUTO: 4.46 10*6/MM3 (ref 3.77–5.28)
SODIUM SERPL-SCNC: 141 MMOL/L (ref 136–145)
SP GR UR STRIP: 1.02 (ref 1–1.03)
UROBILINOGEN UR QL STRIP: ABNORMAL
WBC # BLD AUTO: 6.77 10*3/MM3 (ref 3.4–10.8)
WHOLE BLOOD HOLD SPECIMEN: NORMAL

## 2020-09-12 PROCEDURE — 85025 COMPLETE CBC W/AUTO DIFF WBC: CPT | Performed by: STUDENT IN AN ORGANIZED HEALTH CARE EDUCATION/TRAINING PROGRAM

## 2020-09-12 PROCEDURE — 74177 CT ABD & PELVIS W/CONTRAST: CPT

## 2020-09-12 PROCEDURE — 99283 EMERGENCY DEPT VISIT LOW MDM: CPT

## 2020-09-12 PROCEDURE — 25010000002 IOPAMIDOL 61 % SOLUTION: Performed by: STUDENT IN AN ORGANIZED HEALTH CARE EDUCATION/TRAINING PROGRAM

## 2020-09-12 PROCEDURE — 80053 COMPREHEN METABOLIC PANEL: CPT | Performed by: STUDENT IN AN ORGANIZED HEALTH CARE EDUCATION/TRAINING PROGRAM

## 2020-09-12 PROCEDURE — 83690 ASSAY OF LIPASE: CPT | Performed by: STUDENT IN AN ORGANIZED HEALTH CARE EDUCATION/TRAINING PROGRAM

## 2020-09-12 PROCEDURE — 81003 URINALYSIS AUTO W/O SCOPE: CPT | Performed by: STUDENT IN AN ORGANIZED HEALTH CARE EDUCATION/TRAINING PROGRAM

## 2020-09-12 RX ORDER — IBUPROFEN 600 MG/1
600 TABLET ORAL ONCE
Status: COMPLETED | OUTPATIENT
Start: 2020-09-12 | End: 2020-09-12

## 2020-09-12 RX ORDER — SODIUM CHLORIDE 0.9 % (FLUSH) 0.9 %
10 SYRINGE (ML) INJECTION AS NEEDED
Status: DISCONTINUED | OUTPATIENT
Start: 2020-09-12 | End: 2020-09-12 | Stop reason: HOSPADM

## 2020-09-12 RX ORDER — ONDANSETRON 4 MG/1
4 TABLET, ORALLY DISINTEGRATING ORAL 4 TIMES DAILY PRN
Qty: 12 TABLET | Refills: 0 | OUTPATIENT
Start: 2020-09-12 | End: 2020-10-17

## 2020-09-12 RX ORDER — CYCLOBENZAPRINE HCL 10 MG
10 TABLET ORAL 3 TIMES DAILY PRN
Qty: 10 TABLET | Refills: 0 | OUTPATIENT
Start: 2020-09-12 | End: 2020-10-17

## 2020-09-12 RX ADMIN — IBUPROFEN 600 MG: 600 TABLET, FILM COATED ORAL at 14:33

## 2020-09-12 RX ADMIN — IOPAMIDOL 90 ML: 612 INJECTION, SOLUTION INTRAVENOUS at 13:45

## 2020-09-12 NOTE — ED TRIAGE NOTES
Patient presents to ED with complaint of right lower quadrant pain. States it started 2 days ago, and went to urgent care this morning and states to report here due to possible appendicitis.

## 2020-09-12 NOTE — ED PROVIDER NOTES
"Subjective   44-year-old female presents to the ER chief complaint of 1 day history of right lower abdominal pain.  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  It is painful whenever she has to sit up.  She endorses open nausea, but no vomiting.  No chest pain or shortness of breath.  No urinary symptoms.  Patient had a total hysterectomy.          Review of Systems   Constitutional: Positive for activity change. Negative for appetite change, chills, diaphoresis, fatigue and fever.   HENT: Negative for congestion and rhinorrhea.    Respiratory: Negative for cough, shortness of breath and wheezing.    Cardiovascular: Negative for chest pain, palpitations and leg swelling.   Gastrointestinal: Positive for abdominal pain and nausea. Negative for diarrhea and vomiting.   Genitourinary: Negative for dysuria and flank pain.   Skin: Negative for color change and rash.   Neurological: Negative for dizziness and headaches.   Psychiatric/Behavioral: Negative for agitation. The patient is not nervous/anxious.        Past Medical History:   Diagnosis Date   • Carpal tunnel syndrome     right   • Major depressive disorder    • Tobacco use disorder, severe, dependence        No Known Allergies    Past Surgical History:   Procedure Laterality Date   • AXILLARY HIDRADENITIS EXCISION Right 2018    Procedure: EXCISION OF RIGHT AXILLARY HIDRADENITIS;  Surgeon: Juan Ramon Jefferson MD;  Location: [facility];  Service: General   • CARPAL TUNNEL RELEASE  right   •  SECTION     • ENDOMETRIAL ABLATION     • HYSTERECTOMY     • LAPAROSCOPIC TUBAL LIGATION     • OTHER SURGICAL HISTORY      \"cervical repair\"   • TONSILLECTOMY     • TRIGGER FINGER RELEASE Right     right ring finger       No family history on file.    Social History     Socioeconomic History   • Marital status:      Spouse name: Not on file   • Number of children: Not on file   • Years " "of education: Not on file   • Highest education level: Not on file   Tobacco Use   • Smoking status: Current Every Day Smoker     Packs/day: 0.50     Types: Cigarettes   • Smokeless tobacco: Never Used   Substance and Sexual Activity   • Alcohol use: Yes     Comment: socially   • Drug use: No   • Sexual activity: Defer           Objective    Vitals:    09/12/20 1226 09/12/20 1233   BP: 161/99    BP Location: Right arm    Patient Position: Sitting    Pulse: 93    Resp: 18    Temp: 98.7 °F (37.1 °C)    TempSrc: Tympanic    SpO2: 100%    Weight:  94.8 kg (209 lb)   Height:  170.2 cm (67\")       Physical Exam  Vitals signs and nursing note reviewed.   Constitutional:       General: She is not in acute distress.     Appearance: Normal appearance. She is obese. She is not ill-appearing, toxic-appearing or diaphoretic.   HENT:      Head: Normocephalic and atraumatic.   Eyes:      Conjunctiva/sclera: Conjunctivae normal.   Neck:      Musculoskeletal: Normal range of motion and neck supple.   Cardiovascular:      Rate and Rhythm: Normal rate.      Pulses: Normal pulses.   Pulmonary:      Effort: Pulmonary effort is normal. No respiratory distress.      Breath sounds: Normal breath sounds.   Abdominal:      General: Abdomen is flat. Bowel sounds are normal.      Tenderness: There is abdominal tenderness in the right lower quadrant.   Musculoskeletal: Normal range of motion.         General: No deformity.   Skin:     General: Skin is warm and dry.      Capillary Refill: Capillary refill takes less than 2 seconds.   Neurological:      General: No focal deficit present.      Mental Status: She is alert and oriented to person, place, and time.   Psychiatric:         Mood and Affect: Mood normal.         Behavior: Behavior normal.         Procedures           ED Course      Results for orders placed or performed during the hospital encounter of 09/12/20   Comprehensive Metabolic Panel    Specimen: Blood   Result Value Ref Range    " Glucose 112 (H) 65 - 99 mg/dL    BUN 8 6 - 20 mg/dL    Creatinine 0.77 0.57 - 1.00 mg/dL    Sodium 141 136 - 145 mmol/L    Potassium 3.6 3.5 - 5.2 mmol/L    Chloride 105 98 - 107 mmol/L    CO2 26.0 22.0 - 29.0 mmol/L    Calcium 9.0 8.6 - 10.5 mg/dL    Total Protein 6.7 6.0 - 8.5 g/dL    Albumin 4.20 3.50 - 5.20 g/dL    ALT (SGPT) 13 1 - 33 U/L    AST (SGOT) 15 1 - 32 U/L    Alkaline Phosphatase 94 39 - 117 U/L    Total Bilirubin 0.3 0.0 - 1.2 mg/dL    eGFR  African Amer 98 >60 mL/min/1.73    Globulin 2.5 gm/dL    A/G Ratio 1.7 g/dL    BUN/Creatinine Ratio 10.4 7.0 - 25.0    Anion Gap 10.0 5.0 - 15.0 mmol/L   Lipase    Specimen: Blood   Result Value Ref Range    Lipase 20 13 - 60 U/L   Urinalysis With Microscopic If Indicated (No Culture) - Urine, Clean Catch    Specimen: Urine, Clean Catch   Result Value Ref Range    Color, UA Yellow Yellow, Straw, Dark Yellow, Selene    Appearance, UA Turbid (A) Clear    pH, UA 8.0 5.0 - 9.0    Specific Gravity, UA 1.023 1.003 - 1.030    Glucose, UA Negative Negative    Ketones, UA Negative Negative    Bilirubin, UA Negative Negative    Blood, UA Negative Negative    Protein, UA Negative Negative    Leuk Esterase, UA Negative Negative    Nitrite, UA Negative Negative    Urobilinogen, UA 1.0 E.U./dL 0.2 - 1.0 E.U./dL   CBC Auto Differential    Specimen: Blood   Result Value Ref Range    WBC 6.77 3.40 - 10.80 10*3/mm3    RBC 4.46 3.77 - 5.28 10*6/mm3    Hemoglobin 13.2 12.0 - 15.9 g/dL    Hematocrit 40.2 34.0 - 46.6 %    MCV 90.1 79.0 - 97.0 fL    MCH 29.6 26.6 - 33.0 pg    MCHC 32.8 31.5 - 35.7 g/dL    RDW 13.7 12.3 - 15.4 %    RDW-SD 45.5 37.0 - 54.0 fl    MPV 10.8 6.0 - 12.0 fL    Platelets 320 140 - 450 10*3/mm3    Neutrophil % 49.9 42.7 - 76.0 %    Lymphocyte % 40.2 19.6 - 45.3 %    Monocyte % 7.4 5.0 - 12.0 %    Eosinophil % 1.9 0.3 - 6.2 %    Basophil % 0.3 0.0 - 1.5 %    Immature Grans % 0.3 0.0 - 0.5 %    Neutrophils, Absolute 3.38 1.70 - 7.00 10*3/mm3    Lymphocytes,  Absolute 2.72 0.70 - 3.10 10*3/mm3    Monocytes, Absolute 0.50 0.10 - 0.90 10*3/mm3    Eosinophils, Absolute 0.13 0.00 - 0.40 10*3/mm3    Basophils, Absolute 0.02 0.00 - 0.20 10*3/mm3    Immature Grans, Absolute 0.02 0.00 - 0.05 10*3/mm3    nRBC 0.0 0.0 - 0.2 /100 WBC   Light Blue Top   Result Value Ref Range    Extra Tube hold for add-on    Green Top (Gel)   Result Value Ref Range    Extra Tube Hold for add-ons.    Lavender Top   Result Value Ref Range    Extra Tube hold for add-on    Gold Top - SST   Result Value Ref Range    Extra Tube Hold for add-ons.    Lavender Top   Result Value Ref Range    Extra Tube hold for add-on      CT Abdomen Pelvis With Contrast   Final Result   1. Mildly increased stool burden from the cecum through the mid   descending colon, for which clinical correlation for constipation   is suggested   2. The appendix is visualized and appears normal   3. Atherosclerosis   4. Status post hysterectomy. Ovaries are not well seen, and may   be absent as well   5. Probable left renal cyst. This can be more completely   characterized by ultrasound, which could be accomplished on a   nonemergent basis   6. Please see findings section above for further details   nonemergent basis.      Electronically signed by:  Grazyna Celis MD  9/12/2020 2:09 PM CDT   Workstation: [id number]                                           [facility]  Number of Diagnoses or Management Options  Nausea: new and requires workup  Right lower quadrant abdominal pain: new and requires workup  Diagnosis management comments: Patient placed in bed 17 and evaluated by me. Vital signs are stable, afebrile.  Labs obtained and are grossly unremarkable.  CT abdomen pelvis negative for acute pathology.  Patient received pain medicine in the ER.  On reevaluation, patient is alert and resting comfortably. I discussed the results of the emergency department evaluation.  I recommended primary care follow-up.  Return precautions discussed.   Discharged with a prescription of Zofran.       Amount and/or Complexity of Data Reviewed  Clinical lab tests: ordered and reviewed  Tests in the radiology section of CPT®: ordered and reviewed  Tests in the medicine section of CPT®: ordered and reviewed  Decide to obtain previous medical records or to obtain history from someone other than the patient: yes  Review and summarize past medical records: yes    Patient Progress  Patient progress: improved      Final diagnoses:   Right lower quadrant abdominal pain   Nausea            Bowen Haynes MD  09/12/20 8801

## 2020-09-15 ENCOUNTER — LAB (OUTPATIENT)
Dept: LAB | Facility: HOSPITAL | Age: 45
End: 2020-09-15

## 2020-09-15 ENCOUNTER — OFFICE VISIT (OUTPATIENT)
Dept: FAMILY MEDICINE CLINIC | Facility: CLINIC | Age: 45
End: 2020-09-15

## 2020-09-15 VITALS
WEIGHT: 206.8 LBS | SYSTOLIC BLOOD PRESSURE: 142 MMHG | DIASTOLIC BLOOD PRESSURE: 92 MMHG | HEIGHT: 67 IN | BODY MASS INDEX: 32.46 KG/M2

## 2020-09-15 DIAGNOSIS — R10.9 RIGHT FLANK PAIN: Primary | ICD-10-CM

## 2020-09-15 DIAGNOSIS — R10.9 RIGHT FLANK PAIN: ICD-10-CM

## 2020-09-15 DIAGNOSIS — Z79.899 HIGH RISK MEDICATION USE: ICD-10-CM

## 2020-09-15 LAB
AMPHET+METHAMPHET UR QL: POSITIVE
AMPHETAMINES UR QL: POSITIVE
BARBITURATES UR QL SCN: NEGATIVE
BENZODIAZ UR QL SCN: NEGATIVE
BUPRENORPHINE SERPL-MCNC: NEGATIVE NG/ML
CANNABINOIDS SERPL QL: NEGATIVE
COCAINE UR QL: NEGATIVE
METHADONE UR QL SCN: NEGATIVE
OPIATES UR QL: NEGATIVE
OXYCODONE UR QL SCN: NEGATIVE
PCP UR QL SCN: NEGATIVE
PROPOXYPH UR QL: NEGATIVE
TRICYCLICS UR QL SCN: NEGATIVE

## 2020-09-15 PROCEDURE — 80306 DRUG TEST PRSMV INSTRMNT: CPT

## 2020-09-15 PROCEDURE — 81003 URINALYSIS AUTO W/O SCOPE: CPT

## 2020-09-15 PROCEDURE — 99213 OFFICE O/P EST LOW 20 MIN: CPT | Performed by: NURSE PRACTITIONER

## 2020-09-15 RX ORDER — KETOROLAC TROMETHAMINE 10 MG/1
10 TABLET, FILM COATED ORAL EVERY 6 HOURS PRN
Qty: 15 TABLET | Refills: 0 | OUTPATIENT
Start: 2020-09-15 | End: 2020-10-17

## 2020-09-15 RX ORDER — TRIAMCINOLONE ACETONIDE 40 MG/ML
80 INJECTION, SUSPENSION INTRA-ARTICULAR; INTRAMUSCULAR ONCE
Status: DISCONTINUED | OUTPATIENT
Start: 2020-09-15 | End: 2020-12-24

## 2020-09-15 NOTE — PROGRESS NOTES
"Subjective   Owen Hooper is a 44 y.o. female.  Hospital Follow up for back and abdominal pain. Reports that lower right sided back pain started about 7 days ago. \"I thought I strained my back\". Symptoms worsened 4 days ago with the RT sided back pain \"moved around to my belly\" . Reports that pain comes and goes but never goes away to provide relief. Was told in the ER that she had a lot of stool in colon. She has since had a normal bowel movement.     Back Pain  This is a new problem. The current episode started in the past 7 days. The problem occurs constantly. The problem has been waxing and waning since onset. The pain is present in the lumbar spine. The quality of the pain is described as shooting and stabbing. The pain is at a severity of 5/10. The pain is moderate. The pain is the same all the time. Stiffness is present all day. Associated symptoms include abdominal pain. Pertinent negatives include no bladder incontinence, bowel incontinence, chest pain, dysuria, fever, headaches, leg pain, numbness, paresis, paresthesias, pelvic pain, perianal numbness, tingling, weakness or weight loss. Risk factors include lack of exercise. She has tried NSAIDs for the symptoms. The treatment provided no relief.        The following portions of the patient's history were reviewed and updated as appropriate:   Current Outpatient Medications   Medication Sig Dispense Refill   • citalopram (CeleXA) 20 MG tablet Take 1 tablet by mouth Daily. 30 tablet 5   • cyclobenzaprine (FLEXERIL) 10 MG tablet Take 1 tablet by mouth 3 (Three) Times a Day As Needed for Muscle Spasms. 10 tablet 0   • gabapentin (NEURONTIN) 100 MG capsule Take 1 capsule by mouth 2 (Two) Times a Day. 60 capsule 2   • ondansetron ODT (ZOFRAN-ODT) 4 MG disintegrating tablet Place 1 tablet on the tongue 4 (Four) Times a Day As Needed for Nausea or Vomiting. 12 tablet 0   • phentermine 15 MG capsule Take 15 mg by mouth Every Morning. Pt states she still " "takes from \"time to time\"     • ketorolac (TORADOL) 10 MG tablet Take 1 tablet by mouth Every 6 (Six) Hours As Needed for Moderate Pain . 15 tablet 0     Current Facility-Administered Medications   Medication Dose Route Frequency Provider Last Rate Last Dose   • triamcinolone acetonide (KENALOG-40) injection 80 mg  80 mg Intramuscular Once Ele Fletcher, JAKUB         Current Outpatient Medications on File Prior to Visit   Medication Sig   • citalopram (CeleXA) 20 MG tablet Take 1 tablet by mouth Daily.   • cyclobenzaprine (FLEXERIL) 10 MG tablet Take 1 tablet by mouth 3 (Three) Times a Day As Needed for Muscle Spasms.   • gabapentin (NEURONTIN) 100 MG capsule Take 1 capsule by mouth 2 (Two) Times a Day.   • ondansetron ODT (ZOFRAN-ODT) 4 MG disintegrating tablet Place 1 tablet on the tongue 4 (Four) Times a Day As Needed for Nausea or Vomiting.   • phentermine 15 MG capsule Take 15 mg by mouth Every Morning. Pt states she still takes from \"time to time\"     No current facility-administered medications on file prior to visit.      She has No Known Allergies..    Review of Systems   Constitutional: Negative for diaphoresis, fatigue, fever and weight loss.   HENT: Negative.    Eyes: Negative.    Respiratory: Negative for chest tightness.    Cardiovascular: Negative for chest pain.   Gastrointestinal: Positive for abdominal pain. Negative for bowel incontinence, constipation, diarrhea, nausea and vomiting.   Endocrine: Negative.    Genitourinary: Negative for bladder incontinence, dysuria and pelvic pain.   Musculoskeletal: Positive for back pain.   Allergic/Immunologic: Negative.    Neurological: Negative for tingling, weakness, numbness, headaches and paresthesias.   Hematological: Negative.    Psychiatric/Behavioral: Negative.      Objective    Visit Vitals  /92   Ht 170.2 cm (67\")   Wt 93.8 kg (206 lb 12.8 oz)   LMP  (LMP Unknown)   BMI 32.39 kg/m²     Physical Exam  Vitals signs and nursing note reviewed. "   Constitutional:       Appearance: She is well-developed.   HENT:      Head: Normocephalic.      Right Ear: External ear normal.      Left Ear: External ear normal.   Eyes:      Pupils: Pupils are equal, round, and reactive to light.   Neck:      Musculoskeletal: Normal range of motion and neck supple.   Cardiovascular:      Rate and Rhythm: Normal rate and regular rhythm.      Heart sounds: Normal heart sounds.   Pulmonary:      Effort: Pulmonary effort is normal.      Breath sounds: Normal breath sounds.   Abdominal:      General: Bowel sounds are normal.      Palpations: Abdomen is soft.   Musculoskeletal: Normal range of motion.      Lumbar back: She exhibits tenderness.      Comments: No CVA tenderness   Skin:     General: Skin is warm.      Capillary Refill: Capillary refill takes less than 2 seconds.   Neurological:      Mental Status: She is alert and oriented to person, place, and time.   Psychiatric:         Behavior: Behavior normal.       Assessment/Plan   Problems Addressed this Visit     None      Visit Diagnoses     Right flank pain    -  Primary    Relevant Medications    triamcinolone acetonide (KENALOG-40) injection 80 mg    ketorolac (TORADOL) 10 MG tablet    Other Relevant Orders    Urinalysis With Culture If Indicated - Urine, Clean Catch (Completed)    XR Abdomen KUB      Diagnoses       Codes Comments    Right flank pain    -  Primary ICD-10-CM: R10.9  ICD-9-CM: 789.09         New Medications Ordered This Visit   Medications   • triamcinolone acetonide (KENALOG-40) injection 80 mg   • ketorolac (TORADOL) 10 MG tablet     Sig: Take 1 tablet by mouth Every 6 (Six) Hours As Needed for Moderate Pain .     Dispense:  15 tablet     Refill:  0     1.  Right flank pain:  Administered Kenalog 80 mg IM in office. Discussed intended effects and potential side effects of this medication.  NDC #[id number]  Ordered KUB x-ray to be performed today. Will call her when results are available.   Ordered  anita today and will let her know when results are available.  Encouraged to take Flexeril as this will help reduce muscle tension or pain caused by muscle strain.  Begin Toradol as prescribed  Educated on possible side effects of this medication including but not limited to increased risk for gastrointestinal bleeding  Encouraged not to take any other NSAIDs including but not limited to Advil, ibuprofen, Motrin, Naprosyn or Aleve while on this medication if may increased risk for gastrointestinal bleeding  Encouraged to take this medication with food in order to reduce GI discomfort  Educated to watch for signs of possible GI bleeding such as dark, black or tarry looking stools    Continue on current medications as previously prescribed   I spent 21 minutes in direct face to face contact with patient.  Greater than 50% of this time was spent counseling patient and discussing plan of care.  Return for Next scheduled follow up.        This document has been electronically signed by JAKUB Pinedo on September 29, 2020 08:36 CDT

## 2020-09-15 NOTE — PROGRESS NOTES
Answers for HPI/ROS submitted by the patient on 9/15/2020   Back pain  What is the primary reason for your visit?: Back Pain  Chronicity: new  Onset: in the past 7 days  Frequency: constantly  Progression since onset: waxing and waning  Pain location: lumbar spine  Pain quality: shooting, stabbing  Pain - numeric: 5/10  Pain is: the same all the time  Stiffness is present: all day  abdominal pain: Yes  bladder incontinence: No  bowel incontinence: No  chest pain: No  dysuria: No  fever: No  headaches: No  leg pain: No  numbness: No  paresis: No  paresthesias: No  pelvic pain: No  perianal numbness: No  tingling: No  weakness: No  weight loss: No  Risk factors: lack of exercise

## 2020-09-16 LAB
BILIRUB UR QL STRIP: NEGATIVE
CLARITY UR: ABNORMAL
COLOR UR: YELLOW
GLUCOSE UR STRIP-MCNC: NEGATIVE MG/DL
HGB UR QL STRIP.AUTO: NEGATIVE
KETONES UR QL STRIP: ABNORMAL
LEUKOCYTE ESTERASE UR QL STRIP.AUTO: NEGATIVE
NITRITE UR QL STRIP: NEGATIVE
PH UR STRIP.AUTO: 5.5 [PH] (ref 5–8)
PROT UR QL STRIP: ABNORMAL
SP GR UR STRIP: >=1.03 (ref 1–1.03)
UROBILINOGEN UR QL STRIP: ABNORMAL

## 2020-09-17 ENCOUNTER — TELEPHONE (OUTPATIENT)
Dept: FAMILY MEDICINE CLINIC | Facility: CLINIC | Age: 45
End: 2020-09-17

## 2020-09-17 NOTE — TELEPHONE ENCOUNTER
PATIENT CALLED AT 9 REQUESTING CALL BACK TO SEE IF SHE COULD COME IN LATER TODAY FOR HER PHYSICAL SHE HAS LOCKED HER KEYS IN THE CAR AND COULD NOT MAKE IT IN THIS MORNING FOR HER APPT  SHE IS REQUESTING [facility] CONTACT NUMBER    [phone] (H)

## 2020-09-18 ENCOUNTER — TELEPHONE (OUTPATIENT)
Dept: FAMILY MEDICINE CLINIC | Facility: CLINIC | Age: 45
End: 2020-09-18

## 2020-09-18 NOTE — TELEPHONE ENCOUNTER
Per JAKUB Kennedy, Ms. Hooper has been called with recent lab results & recommendations.  Continue current medications and follow-up as planned or sooner if any problems.    Ele,   She did say that she took some cold and sinus medication she got from her mother.   She did not know the name of it at this time.   She states she did not know if it contained any Sudafed or what it contained.   She said she cold find out if you need.       ----- Message from JAKUB Pinedo sent at 9/16/2020  6:54 AM CDT -----  Urinalysis did show trace of ketones and a trace of protein.  She needs to increase her water intake as this could represent slight dehydration.  It was negative for blood as well as urinary tract infection.  Previous urine drug screen was also collected at that time and it showed positive for amphetamines and methamphetamine.  Please ask her what substances she has been using?

## 2020-09-18 NOTE — PROGRESS NOTES
Per JAKUB Kennedy, Ms. Hooper has been called with recent lab results & recommendations.  Continue current medications and follow-up as planned or sooner if any problems.    Ele,   She did say that she took some cold and sinus medication she got from her mother.   She did not know the name of it at this time.   She states she did not know if it contained any Sudafed or what it contained.   She said she cold find out if you need.

## 2020-09-25 ENCOUNTER — TELEPHONE (OUTPATIENT)
Dept: FAMILY MEDICINE CLINIC | Facility: CLINIC | Age: 45
End: 2020-09-25

## 2020-11-22 DIAGNOSIS — M54.31 SCIATICA OF RIGHT SIDE: ICD-10-CM

## 2020-11-23 RX ORDER — GABAPENTIN 100 MG/1
CAPSULE ORAL
Qty: 60 CAPSULE | OUTPATIENT
Start: 2020-11-23

## 2020-12-24 ENCOUNTER — OFFICE VISIT (OUTPATIENT)
Dept: FAMILY MEDICINE CLINIC | Facility: CLINIC | Age: 45
End: 2020-12-24

## 2020-12-24 DIAGNOSIS — F41.9 ANXIETY AND DEPRESSION: Primary | ICD-10-CM

## 2020-12-24 DIAGNOSIS — F32.A ANXIETY AND DEPRESSION: Primary | ICD-10-CM

## 2020-12-24 DIAGNOSIS — I10 ESSENTIAL HYPERTENSION: ICD-10-CM

## 2020-12-24 PROCEDURE — 99202 OFFICE O/P NEW SF 15 MIN: CPT | Performed by: STUDENT IN AN ORGANIZED HEALTH CARE EDUCATION/TRAINING PROGRAM

## 2020-12-24 RX ORDER — CITALOPRAM 20 MG/1
20 TABLET ORAL DAILY
Qty: 30 TABLET | Refills: 5 | OUTPATIENT
Start: 2020-12-24 | End: 2022-03-09

## 2020-12-24 RX ORDER — AMLODIPINE BESYLATE 5 MG/1
5 TABLET ORAL DAILY
Qty: 30 TABLET | Refills: 0 | Status: SHIPPED | OUTPATIENT
Start: 2020-12-24 | End: 2021-01-13 | Stop reason: SDUPTHER

## 2020-12-24 NOTE — PATIENT INSTRUCTIONS
Start taking norvasc half to one tab daily for htn. Take and record your blood pressures in the morning and evening and bring them with you to your next follow up in 2 weeks.   
unknown

## 2020-12-27 NOTE — PROGRESS NOTES
"  Family Medicine Residency  Octavio Centeno MD    Subjective:     Owen Hooper is a 45 y.o. female who presents for establish care with current need for refills for depression and hypertension.    Patient presents for establishing care with the clinic.  She was let go from her previous clinic for \"no-shows.\"  She is feeling well today, but would like to get back on her medication for depression and restart of medication for hypertension.  Patient states she used to be on Norvasc, but has been off it for some time.  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  She reports frequent readings greater than 150/100.  Patient was also reporting to be on phentermine for weight loss, and gabapentin for sciatica.    The following portions of the patient's history were reviewed and updated as appropriate: allergies, current medications, past family history, past medical history, past social history, past surgical history and problem list.    Past Medical Hx:  Past Medical History:   Diagnosis Date   • Carpal tunnel syndrome     right   • Major depressive disorder    • Tobacco use disorder, severe, dependence        Past Surgical Hx:  Past Surgical History:   Procedure Laterality Date   • AXILLARY HIDRADENITIS EXCISION Right 2018    Procedure: EXCISION OF RIGHT AXILLARY HIDRADENITIS;  Surgeon: Juan Ramon Jefferson MD;  Location: [facility];  Service: General   • CARPAL TUNNEL RELEASE  right   •  SECTION     • ENDOMETRIAL ABLATION     • HYSTERECTOMY     • LAPAROSCOPIC TUBAL LIGATION     • OTHER SURGICAL HISTORY      \"cervical repair\"   • TONSILLECTOMY     • TRIGGER FINGER RELEASE Right     right ring finger       Current Meds:    Current Outpatient Medications:   •  amLODIPine (Norvasc) 5 MG tablet, Take 1 tablet by mouth Daily for 30 days. Take 1/2 to 1 tab daily, Disp: 30 tablet, Rfl: 0  •  " citalopram (CeleXA) 20 MG tablet, Take 1 tablet by mouth Daily for 180 days., Disp: 30 tablet, Rfl: 5    Allergies:  No Known Allergies    Family Hx:  History reviewed. No pertinent family history.     Social History:  Social History     Socioeconomic History   • Marital status:      Spouse name: Not on file   • Number of children: Not on file   • Years of education: Not on file   • Highest education level: Not on file   Tobacco Use   • Smoking status: Current Every Day Smoker     Packs/day: 0.50     Types: Cigarettes   • Smokeless tobacco: Never Used   Substance and Sexual Activity   • Alcohol use: Yes     Comment: socially   • Drug use: No   • Sexual activity: Defer       Review of Systems  Review of Systems   Constitutional: Negative for activity change and appetite change.   HENT: Negative for congestion and ear pain.    Eyes: Negative for pain and discharge.   Respiratory: Negative for chest tightness and shortness of breath.    Cardiovascular: Negative for chest pain and palpitations.   Gastrointestinal: Negative for abdominal distention and abdominal pain.   Endocrine: Negative for cold intolerance and heat intolerance.   Genitourinary: Negative for difficulty urinating and dysuria.   Musculoskeletal: Negative for arthralgias and back pain.   Skin: Negative for color change and rash.   Allergic/Immunologic: Negative for environmental allergies and food allergies.   Neurological: Negative for dizziness and headaches.   Hematological: Negative for adenopathy. Does not bruise/bleed easily.   Psychiatric/Behavioral: Positive for dysphoric mood. Negative for agitation and confusion.       Objective:     LMP  (LMP Unknown)   Physical Exam  Constitutional:       Appearance: She is well-developed.   HENT:      Head: Normocephalic and atraumatic.      Right Ear: Tympanic membrane normal.      Left Ear: Tympanic membrane normal.      Nose: Nose normal.      Mouth/Throat:      Mouth: Mucous membranes are moist.    Eyes:      Pupils: Pupils are equal, round, and reactive to light.   Neck:      Thyroid: No thyromegaly.      Vascular: No JVD.      Trachea: No tracheal deviation.   Cardiovascular:      Rate and Rhythm: Normal rate.      Pulses:           Radial pulses are 2+ on the right side and 2+ on the left side.        Dorsalis pedis pulses are 2+ on the right side and 2+ on the left side.      Heart sounds: Normal heart sounds, S1 normal and S2 normal.   Pulmonary:      Effort: Pulmonary effort is normal.      Breath sounds: Normal breath sounds.   Abdominal:      General: Bowel sounds are normal.   Musculoskeletal: Normal range of motion.   Skin:     General: Skin is warm and dry.      Capillary Refill: Capillary refill takes 2 to 3 seconds.   Neurological:      Mental Status: She is alert and oriented to person, place, and time.      GCS: GCS eye subscore is 4. GCS verbal subscore is 5. GCS motor subscore is 6.   Psychiatric:         Speech: Speech normal.         Behavior: Behavior normal.         Thought Content: Thought content normal.        PHQ-9 Depression Screening  Little interest or pleasure in doing things? 0   Feeling down, depressed, or hopeless? 3   Trouble falling or staying asleep, or sleeping too much? 3   Feeling tired or having little energy? 3   Poor appetite or overeating? 3   Feeling bad about yourself - or that you are a failure or have let yourself or your family down? 2   Trouble concentrating on things, such as reading the newspaper or watching television? 0   Moving or speaking so slowly that other people could have noticed? Or the opposite - being so fidgety or restless that you have been moving around a lot more than usual? 0   Thoughts that you would be better off dead, or of hurting yourself in some way? 0   PHQ-9 Total Score 14   If you checked off any problems, how difficult have these problems made it for you to do your work, take care of things at home, or get along with other people?  Not difficult at all     Assessment/Plan:     Diagnoses and all orders for this visit:    1. Anxiety and depression (Primary)  -     citalopram (CeleXA) 20 MG tablet; Take 1 tablet by mouth Daily for 180 days.  Dispense: 30 tablet; Refill: 5    2. Essential hypertension  -     amLODIPine (Norvasc) 5 MG tablet; Take 1 tablet by mouth Daily for 30 days. Take 1/2 to 1 tab daily  Dispense: 30 tablet; Refill: 0    Restarted patient's Celexa with instructions on starting antidepressants and SI/HI precautions as well as feelings of grandeur.  Patient verbalizes understanding of when to follow back up.  Instructed patient we do not prescribe phentermine here and advised against the use of that medication with her medical history of hypertension.  Patient to get a blood pressure cuff and take her blood pressure morning and night.  She can take 0.5-1 tab of Norvasc daily to maintain blood pressure below 140/90.    · Rx changes: Add Celexa, add Norvasc  · Patient Education: How to take blood pressure, when to return for follow-up  · Compliance at present is estimated to be good.   · Efforts to improve compliance (if necessary) will be directed at Keep appointments, monitor blood pressure.    Depression screening: Patient screened positive for depression based on a PHQ-9 score of 14 on 12/24/2020. Follow-up recommendations include: Follow-up in 2 weeks for another PHQ-9.     Follow-up:     Return in about 2 weeks (around 1/7/2021).    Preventative:  Health Maintenance   Topic Date Due   • COLONOSCOPY  1973   • ANNUAL PHYSICAL  11/30/1976   • Pneumococcal Vaccine 0-64 (1 of 1 - PPSV23) 11/30/1979   • TDAP/TD VACCINES (1 - Tdap) 11/30/1992   • HEPATITIS C SCREENING  09/06/2016   • PAP SMEAR  09/06/2019   • LIPID PANEL  12/07/2019   • INFLUENZA VACCINE  Addressed     Female Preventative: Exercises regularly  Recommended: none  Vaccine Counseling: N/A    Weight  -Class: Obese Class I: 30-34.9kg/m2  -Patient's There is no  height or weight on file to calculate BMI. BMI is above normal parameters. Recommendations include: exercise counseling and nutrition counseling.   increase physical activity and reduce portion size    Alcohol use:  reports current alcohol use.  Nicotine status  reports that she has been smoking cigarettes. She has been smoking about 0.50 packs per day. She has never used smokeless tobacco.    Goals    Better control blood pressure         RISK SCORE: 4      This document has been electronically signed by Octavio Centeno MD on December 27, 2020 13:13 CST    Octavio Centeno MD PGY-2  Part of this note may be an electronic transcription/translation of spoken language to printed text using the Dragon Dictation System.

## 2020-12-30 ENCOUNTER — TELEPHONE (OUTPATIENT)
Dept: FAMILY MEDICINE CLINIC | Facility: CLINIC | Age: 45
End: 2020-12-30

## 2021-01-04 ENCOUNTER — TELEPHONE (OUTPATIENT)
Dept: FAMILY MEDICINE CLINIC | Facility: CLINIC | Age: 46
End: 2021-01-04

## 2021-01-13 ENCOUNTER — OFFICE VISIT (OUTPATIENT)
Dept: FAMILY MEDICINE CLINIC | Facility: CLINIC | Age: 46
End: 2021-01-13

## 2021-01-13 VITALS — HEIGHT: 67 IN | BODY MASS INDEX: 34.46 KG/M2

## 2021-01-13 DIAGNOSIS — F32.A ANXIETY AND DEPRESSION: Primary | ICD-10-CM

## 2021-01-13 DIAGNOSIS — I10 ESSENTIAL HYPERTENSION: ICD-10-CM

## 2021-01-13 DIAGNOSIS — F41.9 ANXIETY AND DEPRESSION: Primary | ICD-10-CM

## 2021-01-13 PROCEDURE — 99213 OFFICE O/P EST LOW 20 MIN: CPT | Performed by: STUDENT IN AN ORGANIZED HEALTH CARE EDUCATION/TRAINING PROGRAM

## 2021-01-13 RX ORDER — AMLODIPINE BESYLATE 5 MG/1
5 TABLET ORAL DAILY
Qty: 30 TABLET | Refills: 0 | Status: SHIPPED | OUTPATIENT
Start: 2021-01-13 | End: 2021-02-12

## 2021-01-13 NOTE — PROGRESS NOTES
I have reviewed the notes, assessments, and/or procedures performed by Octavio Centeno MD during office visit. I concur with her/his documentation and assessment and plan for Owen Hooper.      This document has been electronically signed by Bogdan Stevens MD on January 13, 2021 09:46 CST

## 2021-01-13 NOTE — PROGRESS NOTES
"  Family Medicine Residency  Octavio Centeno MD    Subjective:   You have chosen to receive care through a telephone visit. Do you consent to use a telephone visit for your medical care today? Yes  Owen Hooper is a 45 y.o. female who presents for follow-up for depression and hypertension.    Patient was restarted on her Norvasc and Celexa last visit.  Patient endorses feeling much better and much less depressed.  Her PHQ 9 today was an 11, and was a 14 previously.  Patient has been taking her blood pressure sporadically and has been getting numbers less than 140/90.    The following portions of the patient's history were reviewed and updated as appropriate: allergies, current medications, past family history, past medical history, past social history, past surgical history and problem list.    Past Medical Hx:  Past Medical History:   Diagnosis Date   • Carpal tunnel syndrome     right   • Major depressive disorder    • Tobacco use disorder, severe, dependence        Past Surgical Hx:  Past Surgical History:   Procedure Laterality Date   • AXILLARY HIDRADENITIS EXCISION Right 2018    Procedure: EXCISION OF RIGHT AXILLARY HIDRADENITIS;  Surgeon: Juan Ramon Jefferson MD;  Location: [facility];  Service: General   • CARPAL TUNNEL RELEASE  right   •  SECTION     • ENDOMETRIAL ABLATION     • HYSTERECTOMY     • LAPAROSCOPIC TUBAL LIGATION     • OTHER SURGICAL HISTORY      \"cervical repair\"   • TONSILLECTOMY     • TRIGGER FINGER RELEASE Right     right ring finger       Current Meds:    Current Outpatient Medications:   •  amLODIPine (Norvasc) 5 MG tablet, Take 1 tablet by mouth Daily for 30 days. Take 1/2 to 1 tab daily, Disp: 30 tablet, Rfl: 0  •  citalopram (CeleXA) 20 MG tablet, Take 1 tablet by mouth Daily for 180 days., Disp: 30 tablet, Rfl: 5    Allergies:  No Known Allergies    Family Hx:  History reviewed. No pertinent family history.     Social History:  Social History     Socioeconomic " "History   • Marital status:      Spouse name: Not on file   • Number of children: Not on file   • Years of education: Not on file   • Highest education level: Not on file   Tobacco Use   • Smoking status: Current Every Day Smoker     Packs/day: 0.50     Types: Cigarettes   • Smokeless tobacco: Never Used   Substance and Sexual Activity   • Alcohol use: Yes     Comment: socially   • Drug use: No   • Sexual activity: Defer       Review of Systems  Review of Systems   Constitutional: Negative for activity change and appetite change.   HENT: Negative for congestion and ear pain.    Eyes: Negative for pain and discharge.   Respiratory: Negative for chest tightness and shortness of breath.    Cardiovascular: Negative for chest pain and palpitations.   Gastrointestinal: Negative for abdominal distention and abdominal pain.   Endocrine: Negative for cold intolerance and heat intolerance.   Genitourinary: Negative for difficulty urinating and dysuria.   Musculoskeletal: Negative for arthralgias and back pain.   Skin: Negative for color change and rash.   Allergic/Immunologic: Negative for environmental allergies and food allergies.   Neurological: Negative for dizziness and headaches.   Hematological: Negative for adenopathy. Does not bruise/bleed easily.   Psychiatric/Behavioral: Negative for agitation and confusion.       Objective:     Ht 170.2 cm (67\")   LMP  (LMP Unknown)   BMI 34.46 kg/m²   Physical Exam  No physical exam done as this was a telephone visit  PHQ-9 Depression Screening  Little interest or pleasure in doing things? 1   Feeling down, depressed, or hopeless? 0   Trouble falling or staying asleep, or sleeping too much? 3   Feeling tired or having little energy? 3   Poor appetite or overeating? 3   Feeling bad about yourself - or that you are a failure or have let yourself or your family down? 1   Trouble concentrating on things, such as reading the newspaper or watching television? 0   Moving or " speaking so slowly that other people could have noticed? Or the opposite - being so fidgety or restless that you have been moving around a lot more than usual? 0   Thoughts that you would be better off dead, or of hurting yourself in some way? 0   PHQ-9 Total Score 11   If you checked off any problems, how difficult have these problems made it for you to do your work, take care of things at home, or get along with other people? Not difficult at all     Assessment/Plan:     Diagnoses and all orders for this visit:    1. Anxiety and depression (Primary)    2. Essential hypertension    Patient wants to continue Celexa 20 mg and not increase at this time.  Her PHQ has decreased and we will continue to monitor at each subsequent visit until stable.  Patient to continue to monitor her blood pressure and we will make adjustments in the future as needed.    · Rx changes: None  · Patient Education: Continue to take blood pressure   · Compliance at present is estimated to be good.   · Efforts to improve compliance (if necessary) will be directed at Regular blood pressure monitoring.    Depression screening: Patient screened positive for depression based on a PHQ-9 score of 11 on 1/13/2021. Follow-up recommendations include: Follow-up at next visit.     Follow-up:     Return in about 4 weeks (around 2/10/2021) for Recheck depression.    Preventative:  Health Maintenance   Topic Date Due   • COLONOSCOPY  1973   • ANNUAL PHYSICAL  11/30/1976   • Pneumococcal Vaccine 0-64 (1 of 1 - PPSV23) 11/30/1979   • TDAP/TD VACCINES (1 - Tdap) 11/30/1992   • HEPATITIS C SCREENING  09/06/2016   • PAP SMEAR  09/06/2019   • LIPID PANEL  12/07/2019   • INFLUENZA VACCINE  Addressed   • MENINGOCOCCAL VACCINE  Aged Out         Weight  -Class: Obese Class I: 30-34.9kg/m2  -Patient's Body mass index is 34.46 kg/m². BMI is above normal parameters. Recommendations include: exercise counseling and nutrition counseling.   reduce portion  size    Alcohol use:  reports current alcohol use.  Nicotine status  reports that she has been smoking cigarettes. She has been smoking about 0.50 packs per day. She has never used smokeless tobacco.    Goals    Improve depression and blood pressure       I spent 10 minutes on this telephone encounter  RISK SCORE: 4      This document has been electronically signed by Octavio Centeno MD on January 13, 2021 09:24 CST    Octavio Centeno MD PGY-2  Part of this note may be an electronic transcription/translation of spoken language to printed text using the Dragon Dictation System.

## 2021-01-22 ENCOUNTER — DOCUMENTATION (OUTPATIENT)
Dept: FAMILY MEDICINE CLINIC | Facility: CLINIC | Age: 46
End: 2021-01-22

## 2021-01-22 ENCOUNTER — TELEPHONE (OUTPATIENT)
Dept: FAMILY MEDICINE CLINIC | Facility: CLINIC | Age: 46
End: 2021-01-22

## 2021-01-22 NOTE — PROGRESS NOTES
Called again requesting for FMLA paperwork completed.  Patient requested I completed this paperwork on the first visit for a period of time prior to her being my patient and had fully resolved symptoms of the time I saw her first appointment.  Instructed patient I would give her a note for the day of the visit for work, but would not be completing FMLA paperwork about any time prior to her being my patient.    Patient has called multiple times over the last couple of weeks, and my MA has been unable to contact her by phone directly.    I tried to contact patient today, but left a message to have her call back to the clinic so we can discuss this again.    We will alert  when she calls again, to relay the message that I will not be completing the FMLA paperwork prior to her being my patient.    Of note, patient had a telephone appointment between first appointment and today, and she did not bring up the FMLA paperwork.      This document has been electronically signed by Octavio Centeno MD on January 22, 2021 11:24 CST    Octavio Centeno MD PGY-2  Part of this note may be an electronic transcription/translation of spoken language to printed text using the Dragon Dictation System.

## 2021-01-22 NOTE — TELEPHONE ENCOUNTER
PATIENT CALLED STATING THAT SHE IS WAITING ON SOME PAPERWORK TO BE FILLED OUT BY PCP.  SHE SAID THAT SHE CALLED YESTERDAY ABOUT IT AND HAS NOT HEARD ANYTHING YET.    SHE WOULD LIKE A CALL BACK WHEN POSSIBLE -[phone].    THANKSGINA

## 2021-02-05 ENCOUNTER — TELEPHONE (OUTPATIENT)
Dept: FAMILY MEDICINE CLINIC | Facility: CLINIC | Age: 46
End: 2021-02-05

## 2021-02-05 NOTE — TELEPHONE ENCOUNTER
Patient called again this morning asking about her paperwork for her disability. She said that she was denied her coverage because Dr. Centeno wont fill out the forms. She wants to know why they can not be filled out, since she came to her to be seen for it.  Call back number for her is [phone].    Thanks,  Alyx

## 2021-02-07 ENCOUNTER — DOCUMENTATION (OUTPATIENT)
Dept: FAMILY MEDICINE CLINIC | Facility: CLINIC | Age: 46
End: 2021-02-07

## 2021-02-08 NOTE — PROGRESS NOTES
Called patient about FMLA paperwork. She wants paperwork to cover prior to establishing care with me. Informed her I can not write for time period before she established care, but can for the time period after she established care.  She will send me a message through [employer] letting me know what she wants on her FMLA and then will send clinic new FMLA paperwork. I will complete the form next week and send it to her employer.    Octavio Centeno MD PGY-2    This document has been electronically signed by Octavio Centeno MD on February 7, 2021 18:22 CST

## 2024-08-27 NOTE — PROGRESS NOTES
I have reviewed the patient.  I have reviewed the notes, assessments, and/or procedures performed by Dr Octavio Centeno, I concur with her  documentation and assessment and plan for Owen Hooper.          This document has been electronically signed by Lexx Gilliam MD on December 28, 2020 10:02 CST     Patient daughter in law,Emilie informed (verbal consent in system) can wait on the [place] for now. Orders placed for November. Emilie verbalizes understanding.

## (undated) DEVICE — GLV SURG TRIUMPH LT PF LTX 6.5 STRL

## (undated) DEVICE — SOL IRR NACL 0.9PCT BT 1000ML

## (undated) DEVICE — GLV SURG SENSICARE GREEN W/ALOE PF LF 6.5 STRL

## (undated) DEVICE — GLV SURG SENSICARE GREEN W/ALOE PF LF 7 STRL

## (undated) DEVICE — SKIN AFFIX SURG ADHESIVE 72/CS 0.55ML: Brand: MEDLINE

## (undated) DEVICE — SHEET,DRAPE,53X77,STERILE: Brand: MEDLINE

## (undated) DEVICE — GLV SURG TRIUMPH LT PF LTX 7.5 STRL

## (undated) DEVICE — PK MAJ PROC LF 60

## (undated) DEVICE — GLV SURG SENSICARE GREEN W/ALOE PF LF 8 STRL

## (undated) DEVICE — APPL CHLORAPREP W/TINT 26ML ORNG

## (undated) DEVICE — SPNG GZ WOVN 4X4IN 12PLY 10/BX STRL